# Patient Record
Sex: FEMALE | Race: BLACK OR AFRICAN AMERICAN | NOT HISPANIC OR LATINO | Employment: STUDENT | URBAN - METROPOLITAN AREA
[De-identification: names, ages, dates, MRNs, and addresses within clinical notes are randomized per-mention and may not be internally consistent; named-entity substitution may affect disease eponyms.]

---
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
## 2021-11-23 ENCOUNTER — OFFICE VISIT (OUTPATIENT)
Dept: FAMILY MEDICINE | Facility: CLINIC | Age: 21
End: 2021-11-23
Payer: COMMERCIAL

## 2021-11-23 VITALS
DIASTOLIC BLOOD PRESSURE: 80 MMHG | RESPIRATION RATE: 16 BRPM | BODY MASS INDEX: 18.42 KG/M2 | OXYGEN SATURATION: 98 % | WEIGHT: 104 LBS | HEART RATE: 61 BPM | SYSTOLIC BLOOD PRESSURE: 117 MMHG | TEMPERATURE: 98.9 F

## 2021-11-23 DIAGNOSIS — G47.10 EXCESSIVE SLEEPINESS: ICD-10-CM

## 2021-11-23 DIAGNOSIS — L65.9 HAIR LOSS: ICD-10-CM

## 2021-11-23 DIAGNOSIS — N92.1 MENORRHAGIA WITH IRREGULAR CYCLE: ICD-10-CM

## 2021-11-23 DIAGNOSIS — D50.0 IRON DEFICIENCY ANEMIA DUE TO CHRONIC BLOOD LOSS: Primary | ICD-10-CM

## 2021-11-23 DIAGNOSIS — N89.8 VAGINAL ITCHING: ICD-10-CM

## 2021-11-23 DIAGNOSIS — Z23 HIGH PRIORITY FOR 2019-NCOV VACCINE: ICD-10-CM

## 2021-11-23 DIAGNOSIS — R63.6 UNDERWEIGHT: ICD-10-CM

## 2021-11-23 PROCEDURE — 91300 COVID-19,PF,PFIZER (12+ YRS): CPT | Performed by: FAMILY MEDICINE

## 2021-11-23 PROCEDURE — 99215 OFFICE O/P EST HI 40 MIN: CPT | Mod: 25 | Performed by: FAMILY MEDICINE

## 2021-11-23 PROCEDURE — 0001A COVID-19,PF,PFIZER (12+ YRS): CPT | Performed by: FAMILY MEDICINE

## 2021-11-23 RX ORDER — LEVONORGESTREL AND ETHINYL ESTRADIOL 0.15-0.03
1 KIT ORAL DAILY
Qty: 30 TABLET | Refills: 11 | Status: SHIPPED | OUTPATIENT
Start: 2021-11-23 | End: 2022-02-17

## 2021-11-23 RX ORDER — FLUCONAZOLE 150 MG/1
150 TABLET ORAL DAILY
Qty: 3 TABLET | Refills: 0 | Status: SHIPPED | OUTPATIENT
Start: 2021-11-23 | End: 2021-11-26

## 2021-11-23 ASSESSMENT — ENCOUNTER SYMPTOMS
NERVOUS/ANXIOUS: 0
NECK STIFFNESS: 0
JOINT SWELLING: 0
RESPIRATORY NEGATIVE: 1
ACTIVITY CHANGE: 1
DIZZINESS: 0
UNEXPECTED WEIGHT CHANGE: 1
CARDIOVASCULAR NEGATIVE: 1
ADENOPATHY: 0
ARTHRALGIAS: 0
FEVER: 0
FATIGUE: 1
GASTROINTESTINAL NEGATIVE: 1
LIGHT-HEADEDNESS: 1
NECK PAIN: 0
ENDOCRINE NEGATIVE: 1
FREQUENCY: 0
APPETITE CHANGE: 1
EYES NEGATIVE: 1
DYSURIA: 0
HEADACHES: 0
CHILLS: 0
DIAPHORESIS: 0
DYSPHORIC MOOD: 0
CONFUSION: 0
WEAKNESS: 1
MYALGIAS: 0

## 2021-11-23 NOTE — PATIENT INSTRUCTIONS
Patient Education   Here is the plan from today's visit    Iron deficiency anemia due to chronic blood loss  Menorrhagia with irregular cycle  - start levonorgestrel-ethinyl estradiol (NORDETTE) 0.15-30 MG-MCG tablet; Take 1 tablet by mouth every day at around the same time  - we will call you about setting up IV iron appointments. You will go 3 times. You will need a COVID test before your first appointment.    Excessive sleepiness  Hair loss  Underweight  - may be related to your low iron and not eating well. Recent thyroid testing was normal. Start a daily multi vitamin with Biotin     Vaginal itching  - fluconazole (DIFLUCAN) 150 MG tablet; Take 1 tablet (150 mg) by mouth daily for 3 days     High priority for 2019-nCoV vaccine  - COVID-19,PF,PFIZER (12+ Yrs PURPLE LABEL)    Please call or return to clinic if your symptoms don't go away.    Follow up plan  After 3rd iron infusion for recheck/repeat labs  Thank you for coming to [facility] today.  Lab Testing:  **If you had lab testing today and your results are reassuring or normal they will be mailed to you or sent through [employer] within 7 days.   **If the lab tests need quick action we will call you with the results.  **If you are having labs done on a different day, please call [phone] to schedule at [facility] or [phone] for other [facility] locations. Labs do not offer walk-in appointments.  The phone number we will call with results is # [phone] (home) . If this is not the best number please call our clinic and change the number.  Medication Refills:  If you need any refills please call your pharmacy and they will contact us.   If you need to  your refill at a new pharmacy, please contact the new pharmacy directly. The new pharmacy will help you get your medications transferred faster.   Scheduling:  If you have any concerns about today's visit or wish to schedule another appointment please call our office during  normal business hours [phone] (8-5:00 M-F)  If a referral was made to a [facility] and you don't get a call from central scheduling please call [phone].  If a Mammogram was ordered for you at [facility] call [phone] to schedule or change your appointment.  If you had an EKG/XRay/CT/Ultrasound/MRI ordered the number is [phone] to schedule or change your radiology appointment.   [facility] has limited ultrasound appointments available on Wednesdays, if you would like your ultrasound at [facility], please call [phone] to schedule.   Medical Concerns:  If you have urgent medical concerns please call [phone] at any time of the day.    Erika Ely MD

## 2021-11-23 NOTE — PROGRESS NOTES
Assessment & Plan     Iron deficiency anemia due to chronic blood loss  Menorrhagia with irregular cycle  - start levonorgestrel-ethinyl estradiol (NORDETTE) 0.15-30 MG-MCG tablet; Take 1 tablet by mouth daily  - intolerant of po iron. Agrees to Venofer infusions. Will need RN assist to get scheduled.    Excessive sleepiness/low energy  Hair loss  Underweight  - in the context of anemia, low variety po. Normal TSH.   - start MVI. Reassess s/p Venofer x 3 with full CBC and ferritin  - If not improved with correction of anemia will need to broaden DDX, though recent otherwise neg lab work up reassuring. Should consider underlying mental health diagnosis.    Vaginal itching  - fluconazole (DIFLUCAN) 150 MG tablet; Take 1 tablet (150 mg) by mouth daily for 3 days    High priority for 2019-nCoV vaccine  - COVID-19,PF,PFIZER (12+ Yrs PURPLE LABEL)    Follow-up post 3rd Venofer for repeat labs and recheck      57 minutes spent on the date of the encounter doing chart review, review of outside records, review of test results, patient visit, documentation and discussion with family  :[id number]}     Erika Ely MD  [facility]  ===========================================    Subjective   Cierra is a 21 year old who presents for the following health issues  accompanied by her mother.  Chief Complaint   Patient presents with     RECHECK     vaginal itching; been going on couple months, went to  and itching stopped after taking pill but now burning. Reza blood and hemoglobin was low.      HPI   Seen at [facility] 11/1/21.   Vaginal Discharge     HEARTBURN     Pain with urination   Vaginal Itching     Insomnia      Hgb 9, MCV 67.9  - known menorrhagia and Fe deficiency - was given iron tabs but stopped due to constipation.  Wet prep + yeast - treated with single pill, got better, then now burning and itching again. Clumpy, white.    Has h/o h pylori that she didn't treat for a long time, then finally did. Test  for cure was neg on 11/3/21.  Denies n/v, melena/BRBPR    Review of Systems   Constitutional: Positive for activity change, appetite change, fatigue and unexpected weight change. Negative for chills, diaphoresis and fever.        Weight entered incorrectly in chart. She is 114#   HENT: Negative.         Denies tongue pain   Eyes: Negative.    Respiratory: Negative.    Cardiovascular: Negative.    Gastrointestinal: Negative.    Endocrine: Negative.    Genitourinary: Positive for menstrual problem and vaginal discharge. Negative for dysuria, frequency, genital sores, pelvic pain, urgency and vaginal pain.        Not sexually active.   Discharge is white, clumpy   Musculoskeletal: Negative for arthralgias, joint swelling, myalgias, neck pain and neck stiffness.        Aching in her feet/lower legs    Neurological: Positive for weakness and light-headedness. Negative for dizziness and headaches.   Hematological: Negative for adenopathy.        No bruising, no bleeding from anywhere other than with periods   Psychiatric/Behavioral: Negative for confusion, dysphoric mood and self-injury. The patient is not nervous/anxious.         Mom seems worried about mood, excessive sleeping  Denies substance use            Objective    /80   Pulse 61   Temp 98.9  F (37.2  C) (Oral)   Resp 16   Wt 47.2 kg (104 lb)   LMP 11/17/2021 (Within Days)   SpO2 98%   Breastfeeding No   BMI 18.42 kg/m       Physical Exam  Constitutional:       General: She is not in acute distress.     Appearance: She is not ill-appearing.      Comments: thin   Cardiovascular:      Rate and Rhythm: Normal rate and regular rhythm.      Heart sounds: Normal heart sounds.   Pulmonary:      Effort: Pulmonary effort is normal.      Breath sounds: Normal breath sounds.   Abdominal:      Palpations: Abdomen is soft.      Tenderness: There is no abdominal tenderness.   Skin:     Capillary Refill: Capillary refill takes 2 to 3 seconds.      Coloration: Skin  is not pale.   Neurological:      Mental Status: She is alert.   Psychiatric:      Comments: Appropriately groomed and dressed. Fair eye contact. Denies low mood, worry. Endorses feeling very tired and unmotivated            Office Visit on 10/14/2020   Component Date Value Ref Range Status     Iron 10/14/2020 16* 35 - 180 ug/dL Final     Iron Binding Cap 10/14/2020 380  240 - 430 ug/dL Final     Iron Saturation Index 10/14/2020 4* 15 - 46 % Final     Ferritin 10/14/2020 3* 12 - 150 ng/mL Final     Transferrin 10/14/2020 282  210 - 360 mg/dL Final     WBC 10/14/2020 7.2  4.0 - 11.0 10e9/L Final     RBC Count 10/14/2020 4.80  3.8 - 5.2 10e12/L Final     Hemoglobin 10/14/2020 9.8* 11.7 - 15.7 g/dL Final     Hematocrit 10/14/2020 34.2* 35.0 - 47.0 % Final     MCV 10/14/2020 71* 78 - 100 fl Final     MCH 10/14/2020 20.4* 26.5 - 33.0 pg Final     MCHC 10/14/2020 28.7* 31.5 - 36.5 g/dL Final     RDW 10/14/2020 18.6* 10.0 - 15.0 % Final     Platelet Count 10/14/2020 458* 150 - 450 10e9/L Final     Diff Method 10/14/2020 Automated Method   Final     % Neutrophils 10/14/2020 51.4  % Final     % Lymphocytes 10/14/2020 38.1  % Final     % Monocytes 10/14/2020 9.3  % Final     % Eosinophils 10/14/2020 0.4  % Final     % Basophils 10/14/2020 0.7  % Final     % Immature Granulocytes 10/14/2020 0.1  % Final     Nucleated RBCs 10/14/2020 0  0 /100 Final     Absolute Neutrophil 10/14/2020 3.7  1.6 - 8.3 10e9/L Final     Absolute Lymphocytes 10/14/2020 2.8  0.8 - 5.3 10e9/L Final     Absolute Monocytes 10/14/2020 0.7  0.0 - 1.3 10e9/L Final     Absolute Eosinophils 10/14/2020 0.0  0.0 - 0.7 10e9/L Final     Absolute Basophils 10/14/2020 0.1  0.0 - 0.2 10e9/L Final     Abs Immature Granulocytes 10/14/2020 0.0  0 - 0.4 10e9/L Final     Absolute Nucleated RBC 10/14/2020 0.0   Final     TSH 10/14/2020 2.99  0.40 - 4.00 mU/L Final

## 2021-11-24 ENCOUNTER — TELEPHONE (OUTPATIENT)
Dept: FAMILY MEDICINE | Facility: CLINIC | Age: 21
End: 2021-11-24
Payer: COMMERCIAL

## 2021-11-24 DIAGNOSIS — Z20.822 COVID-19 RULED OUT: ICD-10-CM

## 2021-11-24 DIAGNOSIS — D50.0 IRON DEFICIENCY ANEMIA DUE TO CHRONIC BLOOD LOSS: Primary | ICD-10-CM

## 2021-11-24 PROBLEM — D50.9 ANEMIA, IRON DEFICIENCY: Status: ACTIVE | Noted: 2021-11-24

## 2021-11-24 RX ORDER — ALBUTEROL SULFATE 0.83 MG/ML
2.5 SOLUTION RESPIRATORY (INHALATION)
Status: CANCELLED | OUTPATIENT
Start: 2021-11-24

## 2021-11-24 RX ORDER — ALBUTEROL SULFATE 90 UG/1
1-2 AEROSOL, METERED RESPIRATORY (INHALATION)
Status: CANCELLED
Start: 2021-11-24

## 2021-11-24 RX ORDER — EPINEPHRINE 1 MG/ML
0.3 INJECTION, SOLUTION, CONCENTRATE INTRAVENOUS EVERY 5 MIN PRN
Status: CANCELLED | OUTPATIENT
Start: 2021-11-24

## 2021-11-24 RX ORDER — MEPERIDINE HYDROCHLORIDE 25 MG/ML
25 INJECTION INTRAMUSCULAR; INTRAVENOUS; SUBCUTANEOUS EVERY 30 MIN PRN
Status: CANCELLED | OUTPATIENT
Start: 2021-11-24

## 2021-11-24 RX ORDER — METHYLPREDNISOLONE SODIUM SUCCINATE 125 MG/2ML
125 INJECTION, POWDER, LYOPHILIZED, FOR SOLUTION INTRAMUSCULAR; INTRAVENOUS
Status: CANCELLED
Start: 2021-11-24

## 2021-11-24 RX ORDER — NALOXONE HYDROCHLORIDE 0.4 MG/ML
0.2 INJECTION, SOLUTION INTRAMUSCULAR; INTRAVENOUS; SUBCUTANEOUS
Status: CANCELLED | OUTPATIENT
Start: 2021-11-24

## 2021-11-24 RX ORDER — HEPARIN SODIUM (PORCINE) LOCK FLUSH IV SOLN 100 UNIT/ML 100 UNIT/ML
5 SOLUTION INTRAVENOUS
Status: CANCELLED | OUTPATIENT
Start: 2021-11-24

## 2021-11-24 RX ORDER — HEPARIN SODIUM,PORCINE 10 UNIT/ML
5 VIAL (ML) INTRAVENOUS
Status: CANCELLED | OUTPATIENT
Start: 2021-11-24

## 2021-11-24 RX ORDER — DIPHENHYDRAMINE HYDROCHLORIDE 50 MG/ML
50 INJECTION INTRAMUSCULAR; INTRAVENOUS
Status: CANCELLED
Start: 2021-11-24

## 2021-11-24 ASSESSMENT — ASTHMA QUESTIONNAIRES: ACT_TOTALSCORE: 17

## 2021-11-24 NOTE — TELEPHONE ENCOUNTER
RN called pt to help with IV iron scheduling. Left VM with name and callback number. Can transfer to RN when pt calls back    Mary Su RN

## 2021-11-26 NOTE — TELEPHONE ENCOUNTER
Pt called back and RN relayed need for IV iron. Pt requested [place] location so RN gave [place] number    Mary Su RN

## 2021-12-16 ENCOUNTER — INFUSION THERAPY VISIT (OUTPATIENT)
Dept: INFUSION THERAPY | Facility: CLINIC | Age: 21
End: 2021-12-16
Payer: COMMERCIAL

## 2021-12-16 ENCOUNTER — DOCUMENTATION ONLY (OUTPATIENT)
Dept: ONCOLOGY | Facility: CLINIC | Age: 21
End: 2021-12-16

## 2021-12-16 VITALS
DIASTOLIC BLOOD PRESSURE: 78 MMHG | TEMPERATURE: 97.8 F | HEART RATE: 91 BPM | SYSTOLIC BLOOD PRESSURE: 101 MMHG | OXYGEN SATURATION: 100 % | RESPIRATION RATE: 18 BRPM | WEIGHT: 105 LBS | BODY MASS INDEX: 18.6 KG/M2

## 2021-12-16 DIAGNOSIS — D50.0 IRON DEFICIENCY ANEMIA DUE TO CHRONIC BLOOD LOSS: Primary | ICD-10-CM

## 2021-12-16 PROCEDURE — 99207 PR NO CHARGE LOS: CPT

## 2021-12-16 PROCEDURE — 96375 TX/PRO/DX INJ NEW DRUG ADDON: CPT | Performed by: NURSE PRACTITIONER

## 2021-12-16 PROCEDURE — 96365 THER/PROPH/DIAG IV INF INIT: CPT | Performed by: NURSE PRACTITIONER

## 2021-12-16 PROCEDURE — 96366 THER/PROPH/DIAG IV INF ADDON: CPT | Performed by: NURSE PRACTITIONER

## 2021-12-16 PROCEDURE — 96372 THER/PROPH/DIAG INJ SC/IM: CPT | Mod: 59 | Performed by: NURSE PRACTITIONER

## 2021-12-16 RX ORDER — ALBUTEROL SULFATE 90 UG/1
1-2 AEROSOL, METERED RESPIRATORY (INHALATION)
Status: CANCELLED
Start: 2021-12-18

## 2021-12-16 RX ORDER — EPINEPHRINE 1 MG/ML
0.3 INJECTION, SOLUTION INTRAMUSCULAR; SUBCUTANEOUS EVERY 5 MIN PRN
Status: DISCONTINUED | OUTPATIENT
Start: 2021-12-16 | End: 2021-12-17 | Stop reason: HOSPADM

## 2021-12-16 RX ORDER — MEPERIDINE HYDROCHLORIDE 25 MG/ML
25 INJECTION INTRAMUSCULAR; INTRAVENOUS; SUBCUTANEOUS EVERY 30 MIN PRN
Status: CANCELLED | OUTPATIENT
Start: 2021-12-18

## 2021-12-16 RX ORDER — METHYLPREDNISOLONE SODIUM SUCCINATE 125 MG/2ML
125 INJECTION, POWDER, LYOPHILIZED, FOR SOLUTION INTRAMUSCULAR; INTRAVENOUS
Status: CANCELLED
Start: 2021-12-18

## 2021-12-16 RX ORDER — HEPARIN SODIUM,PORCINE 10 UNIT/ML
5 VIAL (ML) INTRAVENOUS
Status: CANCELLED | OUTPATIENT
Start: 2021-12-18

## 2021-12-16 RX ORDER — EPINEPHRINE 1 MG/ML
0.3 INJECTION, SOLUTION INTRAMUSCULAR; SUBCUTANEOUS EVERY 5 MIN PRN
Status: CANCELLED | OUTPATIENT
Start: 2021-12-18

## 2021-12-16 RX ORDER — HEPARIN SODIUM (PORCINE) LOCK FLUSH IV SOLN 100 UNIT/ML 100 UNIT/ML
5 SOLUTION INTRAVENOUS
Status: CANCELLED | OUTPATIENT
Start: 2021-12-18

## 2021-12-16 RX ORDER — ALBUTEROL SULFATE 0.83 MG/ML
2.5 SOLUTION RESPIRATORY (INHALATION)
Status: CANCELLED | OUTPATIENT
Start: 2021-12-18

## 2021-12-16 RX ORDER — NALOXONE HYDROCHLORIDE 0.4 MG/ML
0.2 INJECTION, SOLUTION INTRAMUSCULAR; INTRAVENOUS; SUBCUTANEOUS
Status: CANCELLED | OUTPATIENT
Start: 2021-12-18

## 2021-12-16 RX ORDER — DIPHENHYDRAMINE HYDROCHLORIDE 50 MG/ML
50 INJECTION INTRAMUSCULAR; INTRAVENOUS
Status: COMPLETED | OUTPATIENT
Start: 2021-12-16 | End: 2021-12-16

## 2021-12-16 RX ORDER — METHYLPREDNISOLONE SODIUM SUCCINATE 125 MG/2ML
125 INJECTION, POWDER, LYOPHILIZED, FOR SOLUTION INTRAMUSCULAR; INTRAVENOUS
Status: DISCONTINUED | OUTPATIENT
Start: 2021-12-16 | End: 2021-12-17 | Stop reason: HOSPADM

## 2021-12-16 RX ORDER — DIPHENHYDRAMINE HYDROCHLORIDE 50 MG/ML
50 INJECTION INTRAMUSCULAR; INTRAVENOUS
Status: CANCELLED
Start: 2021-12-18

## 2021-12-16 RX ADMIN — METHYLPREDNISOLONE SODIUM SUCCINATE 125 MG: 125 INJECTION INTRAMUSCULAR; INTRAVENOUS at 16:23

## 2021-12-16 RX ADMIN — EPINEPHRINE 0.3 MG: 1 INJECTION, SOLUTION INTRAMUSCULAR; SUBCUTANEOUS at 16:27

## 2021-12-16 RX ADMIN — DIPHENHYDRAMINE HYDROCHLORIDE 25 MG: 50 INJECTION INTRAMUSCULAR; INTRAVENOUS at 16:12

## 2021-12-16 RX ADMIN — Medication 250 ML: at 14:23

## 2021-12-16 ASSESSMENT — PAIN SCALES - GENERAL: PAINLEVEL: NO PAIN (0)

## 2021-12-16 NOTE — PROGRESS NOTES
"Entered room to assess patient      Patient scratching limbs stating it \"itches and burns.\"   She has a .   Patient wt = 105 lb.  25mg Benedryl IVP given.      She feels light headed after.  Vomits.  Patient continues to feel nauseated.  Solumedrol given.       and then soon states that her chest hurts and she feels its \"crushing me.\"      Rapid response called.      Epi given IM L thigh.      She feels pressure/pain in her chest.      VS continued to be stable, but patient symptoms did not improve.    911 called.      Patient passed out several times.  Arousable immediately after.      Patient to be transported to ER.     Message sent to ordering provider to discontinue therapy plan and discuss new plan with patient.   12/20 appt cancelled.  Left  for patient   Palak Hargrove, RN      Palak Hargrove, RN        "

## 2021-12-16 NOTE — NURSING NOTE
Situation: Rapid Response was called for a pt experiencing a medication reaction in cancer infusion.    Objective: Pt had been treated with Iron for a primary diagnosis of anemia. Pt received the entire dose and went to the bathroom without difficulty. This was the pt's first time receiving this treatment. Upon her return to the bay, pt began experiencing itching and burning which progressed to chest pain & SOB. Nursing staff called a rapid response and administered 0.3 mg IM epi, 25 mg IV benadryl, & IV solumedrol.    Assessment: Rapid response team found pt lying in infusion chair, hyperventilating, adequate color, alert. C/o left sided chest pain. Displayed severe acute anxiety. Lung sounds found clear & equal all fields. Pt experienced several syncopal episodes secondary to the hyperventilation. Pt additionally experienced an episode of vomiting.     HR: 134  BP: 131/89  RR: 30  SpO2: 100%    Vascular access: 24g IV L AC    Treatment: Pt was provided with low flow O2 via NC for comfort. Staff members provided verbal reassurance and breath coaching to the patient. Additional epi was withheld due to adequate BP & respiratory status.     Location: Cancer Infusion bay    Disposition: 911 was called after assessment by rapid response team. Pt was transported by EMS to [facility] ED.    Protocols Used: Allergic reaction

## 2021-12-16 NOTE — PROGRESS NOTES
Infusion Nursing Note:  Cierra KOHLER Juan presents today for Venofer.    Patient seen by provider today: No   present during visit today: Not Applicable.    Note: This is the pts first time to [facility]. Orientation provided to infusion center, pt also instructed on how and when to use her call light. Questions answered to pt satisfaction. Pt reports some significant fatigue, dizziness with position changes, and some weakness. Education materials provided to pt regarding medication.    At 1555 pt went to the bathroom, upon coming back from bathroom pt reports burning and itching in her legs and hands. Faint, small bumps are noted to BLE. She also reports slight nausea, which can be baseline for her. VSS. Pt denies any trouble breathing, chest pain, or dizziness. Charge RN updated and took over care of this pt.     Intravenous Access:  Peripheral IV placed.    Treatment Conditions:  Not Applicable.    Patient will return 12/20/21 for next appointment.     End of shift report given to infusion RN for completion of care.       Alissa Quiroz, RN

## 2022-01-05 NOTE — PROGRESS NOTES
Notified that Cierra had a hypersensitivity reaction to Iron Venofer in mid December; developed itching, SOB and chest pains and was eventually sent to the ED after receiving benadryl and epinephrine. Improved and was discharged home. Venofer infusion has been discontinued and patient should be rescheduled to discuss alternative treatments for her iron-deficiency anemia.    Alexandra Bryan MD

## 2022-02-17 ENCOUNTER — OFFICE VISIT (OUTPATIENT)
Dept: FAMILY MEDICINE | Facility: CLINIC | Age: 22
End: 2022-02-17
Payer: COMMERCIAL

## 2022-02-17 VITALS
DIASTOLIC BLOOD PRESSURE: 83 MMHG | BODY MASS INDEX: 17.93 KG/M2 | SYSTOLIC BLOOD PRESSURE: 126 MMHG | RESPIRATION RATE: 16 BRPM | HEIGHT: 63 IN | TEMPERATURE: 97.6 F | HEART RATE: 66 BPM | OXYGEN SATURATION: 100 % | WEIGHT: 101.2 LBS

## 2022-02-17 DIAGNOSIS — R63.6 UNDERWEIGHT: ICD-10-CM

## 2022-02-17 DIAGNOSIS — D50.0 IRON DEFICIENCY ANEMIA DUE TO CHRONIC BLOOD LOSS: ICD-10-CM

## 2022-02-17 DIAGNOSIS — Z23 HIGH PRIORITY FOR 2019-NCOV VACCINE: Primary | ICD-10-CM

## 2022-02-17 DIAGNOSIS — N92.1 MENORRHAGIA WITH IRREGULAR CYCLE: ICD-10-CM

## 2022-02-17 DIAGNOSIS — K59.01 SLOW TRANSIT CONSTIPATION: ICD-10-CM

## 2022-02-17 DIAGNOSIS — R53.82 CHRONIC FATIGUE: ICD-10-CM

## 2022-02-17 DIAGNOSIS — Z86.59 HISTORY OF MOOD DISORDER: ICD-10-CM

## 2022-02-17 LAB
ALBUMIN SERPL-MCNC: 4 G/DL (ref 3.4–5)
ALP SERPL-CCNC: 92 U/L (ref 40–150)
ALT SERPL W P-5'-P-CCNC: 12 U/L (ref 0–50)
ANION GAP SERPL CALCULATED.3IONS-SCNC: 8 MMOL/L (ref 3–14)
AST SERPL W P-5'-P-CCNC: 16 U/L (ref 0–45)
BASOPHILS # BLD AUTO: 0 10E3/UL (ref 0–0.2)
BASOPHILS NFR BLD AUTO: 1 %
BILIRUB SERPL-MCNC: 0.5 MG/DL (ref 0.2–1.3)
BUN SERPL-MCNC: 5 MG/DL (ref 7–30)
CALCIUM SERPL-MCNC: 9.3 MG/DL (ref 8.5–10.1)
CHLORIDE BLD-SCNC: 105 MMOL/L (ref 94–109)
CO2 SERPL-SCNC: 25 MMOL/L (ref 20–32)
CREAT SERPL-MCNC: 0.44 MG/DL (ref 0.52–1.04)
CRP SERPL-MCNC: <2.9 MG/L (ref 0–8)
EOSINOPHIL # BLD AUTO: 0 10E3/UL (ref 0–0.7)
EOSINOPHIL NFR BLD AUTO: 1 %
ERYTHROCYTE [DISTWIDTH] IN BLOOD BY AUTOMATED COUNT: 20.1 % (ref 10–15)
GFR SERPL CREATININE-BSD FRML MDRD: >90 ML/MIN/1.73M2
GLUCOSE BLD-MCNC: 89 MG/DL (ref 70–99)
HCT VFR BLD AUTO: 38.8 %
HGB BLD-MCNC: 12.2 G/DL (ref 11.7–15.7)
IMM GRANULOCYTES # BLD: 0 10E3/UL
IMM GRANULOCYTES NFR BLD: 0 %
LYMPHOCYTES # BLD AUTO: 2.8 10E3/UL (ref 0.8–5.3)
LYMPHOCYTES NFR BLD AUTO: 49 %
MCH RBC QN AUTO: 25.4 PG (ref 26.5–33)
MCHC RBC AUTO-ENTMCNC: 31.4 G/DL (ref 31.5–36.5)
MCV RBC AUTO: 81 FL (ref 78–100)
MONOCYTES # BLD AUTO: 0.4 10E3/UL (ref 0–1.3)
MONOCYTES NFR BLD AUTO: 7 %
NEUTROPHILS # BLD AUTO: 2.5 10E3/UL (ref 1.6–8.3)
NEUTROPHILS NFR BLD AUTO: 43 %
PLATELET # BLD AUTO: 367 10E3/UL (ref 150–450)
POTASSIUM BLD-SCNC: 4.1 MMOL/L (ref 3.4–5.3)
PROT SERPL-MCNC: 7.6 G/DL (ref 6.8–8.8)
RBC # BLD AUTO: 4.81 10E6/UL (ref 3.8–5.2)
SODIUM SERPL-SCNC: 138 MMOL/L (ref 133–144)
WBC # BLD AUTO: 5.7 10E3/UL (ref 4–11)

## 2022-02-17 PROCEDURE — 91305 COVID-19,PF,PFIZER (12+ YRS): CPT | Performed by: FAMILY MEDICINE

## 2022-02-17 PROCEDURE — 85025 COMPLETE CBC W/AUTO DIFF WBC: CPT | Performed by: FAMILY MEDICINE

## 2022-02-17 PROCEDURE — 36415 COLL VENOUS BLD VENIPUNCTURE: CPT | Performed by: FAMILY MEDICINE

## 2022-02-17 PROCEDURE — 0052A COVID-19,PF,PFIZER (12+ YRS): CPT | Performed by: FAMILY MEDICINE

## 2022-02-17 PROCEDURE — 82306 VITAMIN D 25 HYDROXY: CPT | Performed by: FAMILY MEDICINE

## 2022-02-17 PROCEDURE — 80053 COMPREHEN METABOLIC PANEL: CPT | Performed by: FAMILY MEDICINE

## 2022-02-17 PROCEDURE — 99215 OFFICE O/P EST HI 40 MIN: CPT | Mod: 25 | Performed by: FAMILY MEDICINE

## 2022-02-17 PROCEDURE — 86140 C-REACTIVE PROTEIN: CPT | Performed by: FAMILY MEDICINE

## 2022-02-17 RX ORDER — LEVONORGESTREL AND ETHINYL ESTRADIOL 0.15-0.03
1 KIT ORAL DAILY
Qty: 30 TABLET | Refills: 11 | Status: SHIPPED | OUTPATIENT
Start: 2022-02-17 | End: 2022-03-18

## 2022-02-17 RX ORDER — BISACODYL 5 MG/1
TABLET, DELAYED RELEASE ORAL
Qty: 60 TABLET | Refills: 11 | Status: SHIPPED | OUTPATIENT
Start: 2022-02-17 | End: 2022-03-18

## 2022-02-17 ASSESSMENT — ASTHMA QUESTIONNAIRES
QUESTION_4 LAST FOUR WEEKS HOW OFTEN HAVE YOU USED YOUR RESCUE INHALER OR NEBULIZER MEDICATION (SUCH AS ALBUTEROL): TWO OR THREE TIMES PER WEEK
QUESTION_3 LAST FOUR WEEKS HOW OFTEN DID YOUR ASTHMA SYMPTOMS (WHEEZING, COUGHING, SHORTNESS OF BREATH, CHEST TIGHTNESS OR PAIN) WAKE YOU UP AT NIGHT OR EARLIER THAN USUAL IN THE MORNING: ONCE A WEEK
QUESTION_2 LAST FOUR WEEKS HOW OFTEN HAVE YOU HAD SHORTNESS OF BREATH: THREE TO SIX TIMES A WEEK
ACT_TOTALSCORE: 16
QUESTION_1 LAST FOUR WEEKS HOW MUCH OF THE TIME DID YOUR ASTHMA KEEP YOU FROM GETTING AS MUCH DONE AT WORK, SCHOOL OR AT HOME: A LITTLE OF THE TIME
QUESTION_5 LAST FOUR WEEKS HOW WOULD YOU RATE YOUR ASTHMA CONTROL: SOMEWHAT CONTROLLED
ACT_TOTALSCORE: 16

## 2022-02-17 ASSESSMENT — ANXIETY QUESTIONNAIRES
2. NOT BEING ABLE TO STOP OR CONTROL WORRYING: NOT AT ALL
5. BEING SO RESTLESS THAT IT IS HARD TO SIT STILL: SEVERAL DAYS
6. BECOMING EASILY ANNOYED OR IRRITABLE: SEVERAL DAYS
GAD7 TOTAL SCORE: 4
7. FEELING AFRAID AS IF SOMETHING AWFUL MIGHT HAPPEN: NOT AT ALL
IF YOU CHECKED OFF ANY PROBLEMS ON THIS QUESTIONNAIRE, HOW DIFFICULT HAVE THESE PROBLEMS MADE IT FOR YOU TO DO YOUR WORK, TAKE CARE OF THINGS AT HOME, OR GET ALONG WITH OTHER PEOPLE: NOT DIFFICULT AT ALL
1. FEELING NERVOUS, ANXIOUS, OR ON EDGE: SEVERAL DAYS
3. WORRYING TOO MUCH ABOUT DIFFERENT THINGS: NOT AT ALL

## 2022-02-17 ASSESSMENT — ENCOUNTER SYMPTOMS
COUGH: 0
WHEEZING: 0
ARTHRALGIAS: 0
MYALGIAS: 0
FEVER: 0
ADENOPATHY: 0
BRUISES/BLEEDS EASILY: 0
DIAPHORESIS: 0
APPETITE CHANGE: 1
JOINT SWELLING: 0
WEAKNESS: 0
SHORTNESS OF BREATH: 1
CHILLS: 0
ACTIVITY CHANGE: 1
HEADACHES: 0
CHEST TIGHTNESS: 0
DIZZINESS: 0
FATIGUE: 1

## 2022-02-17 ASSESSMENT — PATIENT HEALTH QUESTIONNAIRE - PHQ9
5. POOR APPETITE OR OVEREATING: SEVERAL DAYS
SUM OF ALL RESPONSES TO PHQ QUESTIONS 1-9: 8

## 2022-02-17 NOTE — PROGRESS NOTES
Assessment & Plan     Chronic fatigue  Underweight  History of mood disorder/other unclear mental health diagnosis vs other undiagnosed condition  Multiple factors contributing to this troubling presentation. I am concerned about possibility of an under diagnosed mental illness and/or an uncommon other cause for her symptomatology - something infectious, autoimmune? CFS?   - CBC with platelets differential  - Comprehensive metabolic panel  - CRP  - did not order CK as she has no muscle pain    Iron deficiency anemia due to chronic blood loss  Menorrhagia with irregular cycle  - CBC with platelets differential - hgb now above 12 after single iron infusion  - restart levonorgestrel-ethinyl estradiol (NORDETTE) 0.15-30 MG-MCG tablet; Take 1 tablet by mouth daily    Slow transit constipation  - only when taking po iron  - bisacodyl (DULCOLAX) 5 MG EC tablet; Take 1-2 tablets daily as needed for constipation    High priority for 2019-nCoV vaccine  - COVID-19,PF,PFIZER (12+ Yrs GRAY LABEL)    Depending on today's results, I'd like her to have further specialist evaluation, but I'm not quite sure with who. Would very much like to involve Dr. Morgan but family has previously refused psychiatry referrals. Could try to at least get consent for e consult. Could also consider ID, Rheum.     Schedule in person follow-up with me prior to leaving today - 3/3/22.      60 minutes spent on the date of the encounter doing chart review, review of outside records, review of test results, patient visit, documentation and discussion with family     Erika Ely MD  [facility] TISH Norton is a 22 year old who presents for the following health issues  accompanied by her mother.  Chief Complaint   Patient presents with     Hospital F/U     Allergy reaction at [facility] on 12/2022     Anemia     Pt. reports x months  fatigue      Imm/Inj     COVID-19 VACCINE       HPI  "  Continuing care, from 11/23/21 visit. Cierra is iron deficient and intolerant of po so referred for IV replacement. She had an adverse reaction after completing her first infusion that resulted in an ED evaluation - full description in ED note from 12/16/21. Sounds like she developed itching/maybe hives post infusion and then developed panic symptoms. She was given epinephrine and solumedrol and by the time she was in ED she was asymptomatic. Interestingly, hgb from ED same day was 11.7.     Hemoglobin   Date Value Ref Range Status   10/14/2020 9.8 (L) 11.7 - 15.7 g/dL Final   10/30/2019 10.1 (L) 11.7 - 15.7 g/dL Final     Underlying etiology of her anemia is felt to be menorrhagia. Hormonal options for management were reviewed with she and her mother previously and they opted for trial of OCP, which she started in 11/21 and took for 1 month and then did not refill. Cierra says that her period that 1 month \"was better\" and she would like to restart OCP. LMP 1/30/21. Never sexually active per report.    Additional issues: anxious, chronically fatigued, \"sleeps all the time\", low appetite with 18# weight loss over last 2 years. Does have a history of symptomatic h pylori gastritis which was treated in 2019 and with negative retest in 2021. She has repeatedly had normal thyroid testing.    Both Cierra and her mother are very sad today. Her mother says \"she can't do anything, she sleeps all day and night, I bring her food but she only eats once a day\". Cierra says she just feels too tired to do anything most of the time, including eat. Denies nausea or abdominal discomfort, just nothing appeals to her. Says that she wants to go to college and do other things but she doesn't have the energy. Denies feeling depressed or sad. Sort of endorses feeling stressed about how she feels physically. Denies any substance use. Declines to speak with me without mother present.     PHQ 9 score today is 8, scoring 3 on sleeping too much, " 2 on feeling tired/little interest, 1 on little interest of pleasure in doing things and on feeling bad about yourself. No SI.   NANCIE 7 score is 4, scoring 0 on feeling worried or afraid.  ----------------------------------------------------------------  Chart review:  We did not assume Cierra's care until she was 16 yo and there are no records that predate 2015 in [facility] or Care [facility]where.   - [facility] is significant for an episode of B LE weakness and pain that led to a hospital admission in July, 2016 when she was 16. She had a very extensive inpatient evaluation and discharge diagnosis at that time was a likely conversion disorder - record from [facility] is in [facility]. At a follow up visit in October, 2016 her parents told me that when all the tests were negative for a physical cause for her symptoms they consulted a community member who told them that she was likely possessed by a demon. They worked with a Alevism practitioner using prayer and feel she is no longer possessed and her LE symptoms resolved.  - July, 2017 was seen in clinic for depression and suicidality without plan after being caught shop lifting and evaluated at [facility]. Was discharged at home with therapy referral. Unclear that she was every seen. Family has refused further mental health referrals since.    Infrequent follow-up after that (about once a year) at which times labs were ordered but then she was lost to follow-up again-   - November 2018 - seen for fatigue and stomach pain.  - October 2019 - seen for ED follow-up for fatigue, heavy periods, abd pain   - October 2020 - abdominal pain/heavy periods, burning in feet  - November 2021 - last visit with me that lead to iron infusion      Review of Systems   Constitutional: Positive for activity change, appetite change and fatigue. Negative for chills, diaphoresis and fever.        Wt Readings from Last 4 Encounters:  02/17/22 : 45.9 kg (101 lb 3.2 oz)  12/16/21 : 47.6 kg (105 lb)  11/23/21 :  "47.2 kg (104 lb)  10/14/20 : 54.2 kg (119 lb 6.4 oz) (33 %, Z= -0.44)*    * Growth percentiles are based on CDC (Girls, 2-20 Years) data.   Respiratory: Positive for shortness of breath. Negative for cough, chest tightness and wheezing.         Not currently.   ACT score is 16, but is asymptomatic now. Has diagnosis of asthma, but no spirometry.    Gastrointestinal:        Denies nausea, abdominal pain now   Musculoskeletal: Negative for arthralgias, joint swelling and myalgias.   Neurological: Negative for dizziness, weakness and headaches.   Hematological: Negative for adenopathy. Does not bruise/bleed easily.            Objective    /83   Pulse 66   Temp 97.6  F (36.4  C) (Oral)   Resp 16   Ht 1.595 m (5' 2.8\")   Wt 45.9 kg (101 lb 3.2 oz)   LMP 01/30/2022 (Exact Date)   SpO2 100%   BMI 18.04 kg/m       Physical Exam  Constitutional:       Appearance: She is not ill-appearing or toxic-appearing.      Comments: thin   HENT:      Mouth/Throat:      Mouth: Mucous membranes are moist.   Neck:      Thyroid: No thyromegaly.   Cardiovascular:      Rate and Rhythm: Normal rate and regular rhythm.   Pulmonary:      Effort: Pulmonary effort is normal.      Breath sounds: Normal breath sounds.   Abdominal:      Palpations: Abdomen is soft.      Tenderness: There is no abdominal tenderness.   Musculoskeletal:      Cervical back: Normal range of motion.   Lymphadenopathy:      Cervical: No cervical adenopathy.   Skin:     General: Skin is warm.      Capillary Refill: Capillary refill takes less than 2 seconds.   Neurological:      Mental Status: She is alert and oriented to person, place, and time.      Gait: Gait is intact.   Psychiatric:         Attention and Perception: Attention normal.         Mood and Affect: Mood is depressed. Affect is tearful. Affect is not inappropriate.         Speech: Speech is not rapid and pressured or tangential.         Behavior: Behavior is cooperative.         Thought Content: " Thought content does not include suicidal ideation.      Comments: Appropriately groomed and dressed

## 2022-02-18 LAB — DEPRECATED CALCIDIOL+CALCIFEROL SERPL-MC: 5 UG/L (ref 20–75)

## 2022-02-18 ASSESSMENT — ANXIETY QUESTIONNAIRES: GAD7 TOTAL SCORE: 4

## 2022-02-21 DIAGNOSIS — Z72.4 EATING PROBLEM: ICD-10-CM

## 2022-02-21 DIAGNOSIS — E55.9 VITAMIN D DEFICIENCY: Primary | ICD-10-CM

## 2022-02-21 DIAGNOSIS — Z86.59 HISTORY OF MOOD DISORDER: Primary | ICD-10-CM

## 2022-02-21 DIAGNOSIS — R63.6 UNDERWEIGHT: ICD-10-CM

## 2022-02-21 DIAGNOSIS — F32.A DEPRESSION WITH SOMATIZATION: ICD-10-CM

## 2022-02-21 DIAGNOSIS — F45.0 DEPRESSION WITH SOMATIZATION: ICD-10-CM

## 2022-02-21 DIAGNOSIS — R53.82 CHRONIC FATIGUE: ICD-10-CM

## 2022-02-21 RX ORDER — CHOLECALCIFEROL (VITAMIN D3) 50 MCG
1 TABLET ORAL DAILY
Qty: 90 TABLET | Refills: 3 | Status: SHIPPED | OUTPATIENT
Start: 2022-02-21 | End: 2022-05-06

## 2022-02-21 NOTE — RESULT ENCOUNTER NOTE
- Hemoglobin is back in normal range after single iron infusion. May be better able to maintain this if she consistently takes OCP/controls her heavy periods.   - vit d level very low. Advise replacement.  - persistent weight loss, extreme fatigue. Highly suspicious of underlying mental health cause, restrictive eating. Recommend referral to Dr. Morgan/Child Psych     KP

## 2022-02-24 ENCOUNTER — TELEPHONE (OUTPATIENT)
Dept: PSYCHOLOGY | Facility: CLINIC | Age: 22
End: 2022-02-24
Payer: COMMERCIAL

## 2022-02-24 NOTE — TELEPHONE ENCOUNTER
Was able to reach patient at listed home #. She agreed to schedule with Dr. Devries and is scheduled for Monday, February 28th at 3pm for an in person visit.    Routing to Dr. Paz and Dr. Devries for FYI.     MARJORIE Guerrero

## 2022-02-28 ENCOUNTER — OFFICE VISIT (OUTPATIENT)
Dept: PSYCHOLOGY | Facility: CLINIC | Age: 22
End: 2022-02-28
Payer: COMMERCIAL

## 2022-02-28 VITALS
OXYGEN SATURATION: 100 % | HEART RATE: 68 BPM | TEMPERATURE: 98.6 F | DIASTOLIC BLOOD PRESSURE: 83 MMHG | RESPIRATION RATE: 16 BRPM | SYSTOLIC BLOOD PRESSURE: 126 MMHG

## 2022-02-28 DIAGNOSIS — F50.89 OTHER DISORDER OF EATING: ICD-10-CM

## 2022-02-28 DIAGNOSIS — R53.82 CHRONIC FATIGUE: ICD-10-CM

## 2022-02-28 DIAGNOSIS — F33.1 MODERATE EPISODE OF RECURRENT MAJOR DEPRESSIVE DISORDER (H): Primary | ICD-10-CM

## 2022-02-28 PROCEDURE — 99205 OFFICE O/P NEW HI 60 MIN: CPT | Mod: GC | Performed by: STUDENT IN AN ORGANIZED HEALTH CARE EDUCATION/TRAINING PROGRAM

## 2022-02-28 PROCEDURE — 99417 PROLNG OP E/M EACH 15 MIN: CPT | Mod: GC | Performed by: STUDENT IN AN ORGANIZED HEALTH CARE EDUCATION/TRAINING PROGRAM

## 2022-02-28 NOTE — Clinical Note
Let me know if you'd do anything differently! Didn't know how the 90 min visits get billed so I did not place in a level of service. I also wanted to wait for your comments before sending it to Dr. Ely.   Thanks! Dinah

## 2022-02-28 NOTE — Clinical Note
Hi Dr. Ely,    Thank you for referring Cierra!  It seemed she was able to open up during her visit, wants help, and is willing to consider a variety of treatment options.  She is scheduled to see you for a visit tomorrow.  Please see our recommendations (which we discussed with Cierra) and reach out if you have any questions.      As an aside -- thanks to Dr. Nicholson for pulling together such a thorough yet concise note.  Cierra shared a lot during her visit!      Thanks,    Lynette Devries MD  Child & Adolescent Psychiatry
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
## 2022-03-01 PROBLEM — F50.89 OTHER DISORDER OF EATING: Status: ACTIVE | Noted: 2022-03-01

## 2022-03-01 PROBLEM — F34.1 PERSISTENT DEPRESSIVE DISORDER WITH ANXIOUS DISTRESS, CURRENTLY MODERATE: Status: ACTIVE | Noted: 2022-03-01

## 2022-03-02 NOTE — PATIENT INSTRUCTIONS
Plan Today    1) Meds:  Consider trial of fluoxetine; see bottom of the AVS for information about this medication    2) Follow-up:  Visit with Dr. Ely 3/3; scheduled  Recommend additional labs and EKG be completed 3/3   Recommend evaluation regarding eating patterns be completed at [facility] or [place]     3) Other:  Recommend establishing in therapy with your prior therapist at [facility].  You mall directly to schedule with Shelbie (prior therapist)      Thank you for coming to the [facility].    Scheduling:  If you have any concerns about today's visit or wish to schedule another appointment please call our office during normal business hours [phone] (8-5:00 M-F)        MENTAL HEALTH CRISIS NUMBERS:  For a medical emergency please call  911 or go to the nearest ER.     [facility]:   [facility] -[phone]   Crisis Residence [facility] -[phone]   Walk-In Counseling [facility] -[phone]   COPE 24/7 [place] Mobile Team -[phone] (adults)/[phone] (child)  CHILD: [facility] needs assessment team - [phone]      [facility]:   [facility] - [phone]   Walk-in counseling [facility] - [phone]   Walk-in counseling [facility] - [phone]   Crisis Residence [facility] - [phone]  Urgent Care Adult Mental [place]-[phone] mobile unit/ 24/7 crisis line    National Crisis Numbers:   National Suicide Prevention Lifeline: 5-512-558-TALK (228-428-7656)  Poison Control Center - 6-953-022-3336  [employer]/resources for a list of additional resources (SOS)  Trans Lifeline a hotline for transgender people 1-372.863.5401  The Andrea Project a hotline for LGBT youth 6-545-530-4237  Crisis Text Line: For any crisis 24/7   To: 495599  see www.crisistextline.org  - IF MAKING A CALL FEELS TOO HARD, send a text!       Again thank you for  [facility] and please let us know how we can best partner with you to improve you and your family's health.        Patient Education     Fluoxetine HCl Oral Capsule 20 mg  Uses  This medicine is used for the following purposes:    anxiety    depression    eating disorders    menopausal symptoms    muscle weakness    obsessive compulsive disorder    post-traumatic stress disorder  Instructions  This medicine may be taken with or without food.  It is very important that you take the medicine at about the same time every day. It will work best if you do this.  Keep the medicine at room temperature. Avoid heat and direct light.  It is important that you keep taking each dose of this medicine on time even if you are feeling well.  If you forget to take a dose on time, take it as soon as you remember. If it is almost time for the next dose, do not take the missed dose. Return to your normal dosing schedule. Do not take 2 doses of this medicine at one time.  Please tell your doctor and pharmacist about all the medicines you take. Include both prescription and over-the-counter medicines. Also tell them about any vitamins, herbal medicines, or anything else you take for your health.  Do not suddenly stop taking this medicine. Check with your doctor before stopping.  Cautions  Tell your doctor and pharmacist if you ever had an allergic reaction to a medicine. Symptoms of an allergic reaction can include trouble breathing, skin rash, itching, swelling, or severe dizziness.  Do not use the medication any more than instructed.  Your ability to stay alert or to react quickly may be impaired by this medicine. Do not drive or operate machinery until you know how this medicine will affect you.  Please check with your doctor before drinking alcohol while on this medicine.  Contact your doctor if you notice a change in the amount or darkening of your urine.  Family should check on the patient often. Call  the doctor if patient becomes more depressed, has thoughts of suicide, or shows changes in behavior.  Call the doctor if there are any signs of confusion or unusual changes in behavior.  Tell the doctor or pharmacist if you are pregnant, planning to be pregnant, or breastfeeding.  Ask your pharmacist if this medicine can interact with any of your other medicines. Be sure to tell them about all the medicines you take.  Do not start or stop any other medicines without first speaking to your doctor or pharmacist.  If you have painful erection or an erection for more than 4 hours, seek medical care right away.  Do not share this medicine with anyone who has not been prescribed this medicine.  This medicine can cause serious side effects in some patients. Important information from the U.S. Food and Drug Administration (FDA) is available from your pharmacist. Please review it carefully with your pharmacist to understand the risks associated with this medicine.  Side Effects  The following is a list of some common side effects from this medicine. Please speak with your doctor about what you should do if you experience these or other side effects.    agitated feeling or trouble sleeping    decreased appetite    dizziness    drowsiness or sedation    lack of energy and tiredness    nausea    sweating  Call your doctor or get medical help right away if you notice any of these more serious side effects:    bleeding that is severe or takes longer to stop    unusual bruising or discoloration on skin    confusion    coughing up blood or vomit that looks like coffee grounds    swelling of the legs, feet, and hands    pain in the eye    fainting    hallucinations (unusual thoughts, seeing or hearing things that are not real)    fast or irregular heart beats    muscle aches, spasms or abnormal movements    muscle weakness    dilation of the pupils    problems with sexual functions or desire    blood in stool    dark, tarry  stool    suicidal thoughts    blurring or changes of vision    severe or persistent vomiting    unexpected or extreme weight loss  A few people may have an allergic reactions to this medicine. Symptoms can include difficulty breathing, skin rash, itching, swelling, or severe dizziness. If you notice any of these symptoms, seek medical help quickly.  Extra  Please speak with your doctor, nurse, or pharmacist if you have any questions about this medicine.  [email or web address]  IMPORTANT NOTE: This document tells you briefly how to take your medicine, but it does not tell you all there is to know about it.Your doctor or pharmacist may give you other documents about your medicine. Please talk to them if you have any questions.Always follow their advice. There is a more complete description of this medicine available in English.Scan this code on your smartphone or tablet or use the web address below. You can also ask your pharmacist for a printout. If you have any questions, please ask your pharmacist.     2021 [employer].

## 2022-03-15 ENCOUNTER — OFFICE VISIT (OUTPATIENT)
Dept: FAMILY MEDICINE | Facility: CLINIC | Age: 22
End: 2022-03-15
Payer: COMMERCIAL

## 2022-03-15 VITALS
RESPIRATION RATE: 16 BRPM | HEART RATE: 69 BPM | BODY MASS INDEX: 18.25 KG/M2 | TEMPERATURE: 98.2 F | HEIGHT: 63 IN | WEIGHT: 103 LBS | OXYGEN SATURATION: 99 %

## 2022-03-15 DIAGNOSIS — R63.6 UNDERWEIGHT: Primary | ICD-10-CM

## 2022-03-15 DIAGNOSIS — F50.89 OTHER DISORDER OF EATING: ICD-10-CM

## 2022-03-15 DIAGNOSIS — F34.1 PERSISTENT DEPRESSIVE DISORDER WITH ANXIOUS DISTRESS, CURRENTLY MODERATE: ICD-10-CM

## 2022-03-15 DIAGNOSIS — Z72.4 EATING PROBLEM: ICD-10-CM

## 2022-03-15 LAB
FERRITIN SERPL-MCNC: 5 NG/ML (ref 12–150)
MAGNESIUM SERPL-MCNC: 2.2 MG/DL (ref 1.6–2.3)
PHOSPHATE SERPL-MCNC: 3.6 MG/DL (ref 2.5–4.5)
TSH SERPL DL<=0.005 MIU/L-ACNC: 2.66 MU/L (ref 0.4–4)

## 2022-03-15 PROCEDURE — 84443 ASSAY THYROID STIM HORMONE: CPT | Performed by: FAMILY MEDICINE

## 2022-03-15 PROCEDURE — 93000 ELECTROCARDIOGRAM COMPLETE: CPT | Performed by: FAMILY MEDICINE

## 2022-03-15 PROCEDURE — 99214 OFFICE O/P EST MOD 30 MIN: CPT | Performed by: FAMILY MEDICINE

## 2022-03-15 PROCEDURE — 82728 ASSAY OF FERRITIN: CPT | Performed by: FAMILY MEDICINE

## 2022-03-15 PROCEDURE — 83735 ASSAY OF MAGNESIUM: CPT | Performed by: FAMILY MEDICINE

## 2022-03-15 PROCEDURE — 84100 ASSAY OF PHOSPHORUS: CPT | Performed by: FAMILY MEDICINE

## 2022-03-15 PROCEDURE — 36415 COLL VENOUS BLD VENIPUNCTURE: CPT | Performed by: FAMILY MEDICINE

## 2022-03-15 RX ORDER — FLUOXETINE 10 MG/1
10 CAPSULE ORAL DAILY
Status: CANCELLED | OUTPATIENT
Start: 2022-03-15

## 2022-03-15 RX ORDER — FLUOXETINE 10 MG/1
CAPSULE ORAL
Qty: 49 CAPSULE | Refills: 0 | Status: SHIPPED | OUTPATIENT
Start: 2022-03-15 | End: 2022-08-31

## 2022-03-15 ASSESSMENT — ANXIETY QUESTIONNAIRES
IF YOU CHECKED OFF ANY PROBLEMS ON THIS QUESTIONNAIRE, HOW DIFFICULT HAVE THESE PROBLEMS MADE IT FOR YOU TO DO YOUR WORK, TAKE CARE OF THINGS AT HOME, OR GET ALONG WITH OTHER PEOPLE: NOT DIFFICULT AT ALL
1. FEELING NERVOUS, ANXIOUS, OR ON EDGE: SEVERAL DAYS
6. BECOMING EASILY ANNOYED OR IRRITABLE: NEARLY EVERY DAY
GAD7 TOTAL SCORE: 8
7. FEELING AFRAID AS IF SOMETHING AWFUL MIGHT HAPPEN: NOT AT ALL
2. NOT BEING ABLE TO STOP OR CONTROL WORRYING: SEVERAL DAYS
5. BEING SO RESTLESS THAT IT IS HARD TO SIT STILL: SEVERAL DAYS
3. WORRYING TOO MUCH ABOUT DIFFERENT THINGS: SEVERAL DAYS

## 2022-03-15 ASSESSMENT — PATIENT HEALTH QUESTIONNAIRE - PHQ9
5. POOR APPETITE OR OVEREATING: SEVERAL DAYS
SUM OF ALL RESPONSES TO PHQ QUESTIONS 1-9: 15

## 2022-03-15 NOTE — PATIENT INSTRUCTIONS
"CRISIS RESOURCES  Crisis Intervention: 492.499.2208 or 095-850-7040 (TTY: 354.911.3171).  Call anytime for help.   National [place] on Mental Illness (www.mn.[place].org): 873.967.7165 or 725-249-6132.   Suicide Awareness Voices of Education (SAVE) (www.save.org): 358-418-WQXH (7219)   National Suicide Prevention Line (www.mentalhealthmn.org): 947-384-WBOU (5558)   [facility] Crisis Response - Adult 220 044-4564   Text 4 Life: txt \"LIFE\" to 10655 for immediate support and crisis intervention   Crisis text line: Text \"[place]\" to 573210. Free, confidential, 24/7.        "

## 2022-03-15 NOTE — Clinical Note
Maura,   I want to make sure this referral goes to the right place - I'm trying to get her the services recommended by Dr. Devries. Can you review for me please? Thx    KIMBERLYN

## 2022-03-15 NOTE — PROGRESS NOTES
"  Assessment & Plan     Persistent depressive disorder with anxious distress, currently moderate  - Adult Mental Health  Referral; Future - for counseling  - start FLUoxetine (PROZAC) 10 MG capsule; Take 1 capsule (10 mg) by mouth daily for 7 days, THEN 2 capsules (20 mg) daily for 21 days. Discussed potential side effects including rare risk of increased suicidality and has crisis resources. Advised to take in am for activating effect.    Other disorder of eating  Underweight  - TSH, T4, Mg, Phos, Ferritin  - EKG 12-lead complete w/read - Clinics  - Adult Mental Health  Referral; Future - [facility] preferred    Flu shot declined.     Return in 3 weeks (on 4/5/2022) for with me, in person.    Erika Ely MD  [facility]  =====================================     Subjective   Cierra is a 22 year old who presents for the following health issues  accompanied by her mother.  Chief Complaint   Patient presents with     RECHECK     Follow-up psych.        HPI   Continuing care s/p peds psychiatry consult on 2/28/22. Appreciate evaluation and recommendations.     Principle diagnosis: MDD; recurrent; moderate; with anxious distress  Secondary diagnosis: disordered eating  Possible contributing medical diagnosis; Hx of concussion/LOC in childhood    Today reports feeling \"about the same\". Maybe a little more hopeful after consult, that she could actually feel better. Trying to be more mindful about eating and has gained 2#. Doesn't purge.     Wt Readings from Last 4 Encounters:   03/15/22 46.7 kg (103 lb)   02/17/22 45.9 kg (101 lb 3.2 oz)   12/16/21 47.6 kg (105 lb)   11/23/21 47.2 kg (104 lb)     Discussed recommendation for medication with Cierra and her mother. Mother fully supportive, Cierra nervous but willing.     Review of Systems   Constitutional: Positive for fatigue.        Still sleeping quite a lot   Respiratory: Negative.    Gastrointestinal:        No acute GI " "symptoms   Psychiatric/Behavioral: Positive for dysphoric mood. Negative for self-injury and suicidal ideas. The patient is nervous/anxious.       PHQ 9 score 9, no SI  NANCIE 7 score 8      Objective    Pulse 69   Temp 98.2  F (36.8  C) (Oral)   Resp 16   Ht 1.595 m (5' 2.8\")   Wt 46.7 kg (103 lb)   LMP 02/25/2022 (Approximate)   SpO2 99%   BMI 18.36 kg/m     /83    Physical Exam  Constitutional:       General: She is not in acute distress.     Appearance: She is not ill-appearing.      Comments: thin   Neurological:      Mental Status: She is alert.   Psychiatric:         Attention and Perception: Attention normal.         Speech: Speech normal.         Behavior: Behavior is cooperative.      Comments: Appropriately groomed and dressed. Good eye contact. Calm and polite.                     "

## 2022-03-15 NOTE — LETTER
March 18, 2022      Cierra Martinez  [address] [address]        Dear Cierra,    Thank you for getting your care at [facility]. Please see below for your test results.    Your results are normal except for low Ferritin/iron stores. We can talk more about options for helping with this at your next appointment.     Resulted Orders   TSH with free T4 reflex   Result Value Ref Range    TSH 2.66 0.40 - 4.00 mU/L   Magnesium   Result Value Ref Range    Magnesium 2.2 1.6 - 2.3 mg/dL   Phosphorus   Result Value Ref Range    Phosphorus 3.6 2.5 - 4.5 mg/dL   Ferritin   Result Value Ref Range    Ferritin 5 (L) 12 - 150 ng/mL       If you have any concerns about these results please call and leave a message for me or send a mechatronic systemtechnikt message to the clinic.    Sincerely,    Erika Ely MD

## 2022-03-16 ASSESSMENT — ANXIETY QUESTIONNAIRES: GAD7 TOTAL SCORE: 8

## 2022-03-18 ASSESSMENT — ENCOUNTER SYMPTOMS
FATIGUE: 1
DYSPHORIC MOOD: 1
RESPIRATORY NEGATIVE: 1
NERVOUS/ANXIOUS: 1

## 2022-04-05 ENCOUNTER — OFFICE VISIT (OUTPATIENT)
Dept: FAMILY MEDICINE | Facility: CLINIC | Age: 22
End: 2022-04-05
Payer: COMMERCIAL

## 2022-04-05 VITALS
BODY MASS INDEX: 17.43 KG/M2 | SYSTOLIC BLOOD PRESSURE: 120 MMHG | HEIGHT: 63 IN | RESPIRATION RATE: 16 BRPM | DIASTOLIC BLOOD PRESSURE: 86 MMHG | WEIGHT: 98.4 LBS | OXYGEN SATURATION: 97 % | HEART RATE: 94 BPM | TEMPERATURE: 98 F

## 2022-04-05 DIAGNOSIS — F34.1 PERSISTENT DEPRESSIVE DISORDER WITH ANXIOUS DISTRESS, CURRENTLY MODERATE: ICD-10-CM

## 2022-04-05 DIAGNOSIS — R63.4 WEIGHT LOSS: ICD-10-CM

## 2022-04-05 DIAGNOSIS — R11.0 NAUSEA: ICD-10-CM

## 2022-04-05 DIAGNOSIS — N92.1 MENORRHAGIA WITH IRREGULAR CYCLE: ICD-10-CM

## 2022-04-05 DIAGNOSIS — R79.0 LOW FERRITIN: Primary | ICD-10-CM

## 2022-04-05 DIAGNOSIS — Z72.4 EATING PROBLEM: ICD-10-CM

## 2022-04-05 PROCEDURE — 99214 OFFICE O/P EST MOD 30 MIN: CPT | Performed by: FAMILY MEDICINE

## 2022-04-05 RX ORDER — MULTIVITAMIN WITH IRON
1 TABLET ORAL DAILY
Qty: 90 TABLET | Refills: 4 | Status: SHIPPED | OUTPATIENT
Start: 2022-04-05 | End: 2022-05-06

## 2022-04-05 RX ORDER — ONDANSETRON 4 MG/1
4 TABLET, FILM COATED ORAL EVERY 8 HOURS PRN
Qty: 60 TABLET | Refills: 1 | Status: SHIPPED | OUTPATIENT
Start: 2022-04-05 | End: 2024-08-20

## 2022-04-05 RX ORDER — LEVONORGESTREL AND ETHINYL ESTRADIOL 0.15-0.03
1 KIT ORAL DAILY
Qty: 30 TABLET | Refills: 11 | Status: SHIPPED | OUTPATIENT
Start: 2022-04-05 | End: 2022-08-31

## 2022-04-05 NOTE — PROGRESS NOTES
"  Assessment & Plan     Persistent depressive disorder with anxious distress, currently moderate  - continue at Fluoxetine (PROZAC) 20 MG capsule; Take 1 capsule (20 mg) by mouth daily    Eating problem  Nausea  Weight loss  Multifactoral. Predates initiation of Fluoxetine, but notes more nausea since starting. She feels strongly that med is helping her mood however and prefers not to decrease dose, discontinue, change yet. Will trial adding Zofran premed and prn. Check in by My Chart in 2 weeks with in person weight check in 4 weeks.   - ondansetron (ZOFRAN) 4 MG tablet; Take 1 tablet (4 mg) by mouth every 8 hours as needed for nausea  - Follow-up with  requested. Has still not established care with ongoing therapist, referral is in place.    Low ferritin  - refill Multiple Vitamins/Iron TABS; Take 1 tablet by mouth daily. Take at bedtime.    Menorrhagia with irregular cycle  - refill levonorgestrel-ethinyl estradiol (NORDETTE) 0.15-30 MG-MCG tablet; Take 1 tablet by mouth daily    Erika Ely MD  [facility]  ====================================    Subjective   Cierra is a 22 year old who presents for the following health issues  accompanied by her mother.  Chief Complaint   Patient presents with     RECHECK     f/u fatigue        HPI   Fatigue in context of MDD  Started on Fluoxetine 3/15/22. Feels that it is helping her mood and energy level - says \"I'm not staying in bed all day now\". Notes however that since starting med she has had even less interest in eating due to increased nausea. No vomiting. Restrictive eating was already a problem for her and she has lost an additional 5#. Her BMI is now 17.    Disordered eating predates med start. EKG done last visit and normal. TSH WNL. Lytes WNL. Ferritin low.     referral in place but she has not scheduled. Says she wasn't contacted.    Requests refills on mult meds including OCP which she is using to manage menorrhagia. That is working " "very well for her.      Review of Systems   Constitutional: Negative for diaphoresis and fever.   HENT: Negative.    Respiratory: Negative.    Cardiovascular: Negative.    Gastrointestinal: Negative for abdominal pain, constipation, diarrhea and heartburn.   Endocrine: Negative.    Genitourinary: Negative.    Neurological: Negative.    Hematological: Negative.    Psychiatric/Behavioral:        PHQ 9 score 10. No SI.  NANCIE 7 score 6            Objective    /86   Pulse 94   Temp 98  F (36.7  C) (Oral)   Resp 16   Ht 1.6 m (5' 2.99\")   Wt 44.6 kg (98 lb 6.4 oz)   LMP  (LMP Unknown)   SpO2 97%   BMI 17.44 kg/m      Physical Exam  Constitutional:       General: She is not in acute distress.     Appearance: She is not ill-appearing.      Comments: thin   Neurological:      Mental Status: She is alert.   Psychiatric:         Speech: Speech is not rapid and pressured or tangential.         Behavior: Behavior is cooperative.         Thought Content: Thought content is not paranoid. Thought content does not include homicidal or suicidal ideation.      Comments: Appropriately groomed and dressed. Good eye contact. Always polite.             Office Visit on 03/15/2022   Component Date Value Ref Range Status     TSH 03/15/2022 2.66  0.40 - 4.00 mU/L Final     Magnesium 03/15/2022 2.2  1.6 - 2.3 mg/dL Final     Phosphorus 03/15/2022 3.6  2.5 - 4.5 mg/dL Final     Ferritin 03/15/2022 5 (A) 12 - 150 ng/mL Final               "

## 2022-04-09 ASSESSMENT — ENCOUNTER SYMPTOMS
DIARRHEA: 0
CARDIOVASCULAR NEGATIVE: 1
DIAPHORESIS: 0
RESPIRATORY NEGATIVE: 1
ENDOCRINE NEGATIVE: 1
ABDOMINAL PAIN: 0
CONSTIPATION: 0
FEVER: 0
HEMATOLOGIC/LYMPHATIC NEGATIVE: 1
NEUROLOGICAL NEGATIVE: 1
HEARTBURN: 0

## 2022-05-03 DIAGNOSIS — J45.30 MILD PERSISTENT ASTHMA WITHOUT COMPLICATION: Chronic | ICD-10-CM

## 2022-05-03 DIAGNOSIS — E55.9 VITAMIN D DEFICIENCY: ICD-10-CM

## 2022-05-03 DIAGNOSIS — F34.1 PERSISTENT DEPRESSIVE DISORDER WITH ANXIOUS DISTRESS, CURRENTLY MODERATE: ICD-10-CM

## 2022-05-03 DIAGNOSIS — R79.0 LOW FERRITIN: ICD-10-CM

## 2022-05-03 NOTE — TELEPHONE ENCOUNTER
[facility] phone call message- patient requesting a refill:    Full Medication Name:levonorgestrel-ethinyl estradiol (NORDETTE) 0.15-30 MG-MCG tablet     Multiple Vitamins/Iron TABS    FLUoxetine (PROZAC) 20 MG capsule    ondansetron (ZOFRAN) 4 MG tablet    vitamin D3 (CHOLECALCIFEROL) 50 mcg (2000 units) tablet    albuterol (PROVENTIL HFA) 108 (90 Base) MCG/ACT inhaler    fluticasone (FLOVENT HFA) 44 MCG/ACT inhaler      Dose: See chart    Pharmacy confirmed as       [id number] - [address] AT [address] [address]  Phone: [phone] Fax: [phone]  : Yes    Additional Comments: Patient leaving for [facility] on Thursday (05/07) requesting 3 month supply of medication.     OK to leave a message on voice mail? Yes    Primary language: English      needed? No    Call taken on May 3, 2022 at 10:45 AM by Kathy Gagnon

## 2022-05-06 RX ORDER — FLUTICASONE PROPIONATE 44 MCG
2 AEROSOL WITH ADAPTER (GRAM) INHALATION 2 TIMES DAILY
Qty: 10.6 G | Refills: 3 | Status: SHIPPED | OUTPATIENT
Start: 2022-05-06

## 2022-05-06 RX ORDER — CHOLECALCIFEROL (VITAMIN D3) 50 MCG
1 TABLET ORAL DAILY
Qty: 90 TABLET | Refills: 3 | Status: SHIPPED | OUTPATIENT
Start: 2022-05-06 | End: 2022-11-08

## 2022-05-06 RX ORDER — MULTIVITAMIN WITH IRON
1 TABLET ORAL DAILY
Qty: 90 TABLET | Refills: 4 | Status: SHIPPED | OUTPATIENT
Start: 2022-05-06 | End: 2022-08-31

## 2022-05-06 RX ORDER — ALBUTEROL SULFATE 90 UG/1
2 AEROSOL, METERED RESPIRATORY (INHALATION) EVERY 4 HOURS PRN
Qty: 18 G | Refills: 3 | Status: SHIPPED | OUTPATIENT
Start: 2022-05-06 | End: 2024-09-10

## 2022-08-31 ENCOUNTER — TELEPHONE (OUTPATIENT)
Dept: PSYCHOLOGY | Facility: CLINIC | Age: 22
End: 2022-08-31

## 2022-08-31 ENCOUNTER — OFFICE VISIT (OUTPATIENT)
Dept: FAMILY MEDICINE | Facility: CLINIC | Age: 22
End: 2022-08-31
Payer: COMMERCIAL

## 2022-08-31 VITALS
BODY MASS INDEX: 18.39 KG/M2 | WEIGHT: 103.8 LBS | TEMPERATURE: 98.2 F | HEART RATE: 86 BPM | OXYGEN SATURATION: 99 % | HEIGHT: 63 IN | DIASTOLIC BLOOD PRESSURE: 82 MMHG | SYSTOLIC BLOOD PRESSURE: 118 MMHG

## 2022-08-31 DIAGNOSIS — F34.1 PERSISTENT DEPRESSIVE DISORDER WITH ANXIOUS DISTRESS, CURRENTLY MODERATE: Primary | ICD-10-CM

## 2022-08-31 DIAGNOSIS — N92.1 MENORRHAGIA WITH IRREGULAR CYCLE: ICD-10-CM

## 2022-08-31 DIAGNOSIS — R63.6 UNDERWEIGHT: ICD-10-CM

## 2022-08-31 DIAGNOSIS — Z72.4 EATING PROBLEM: ICD-10-CM

## 2022-08-31 DIAGNOSIS — D50.0 IRON DEFICIENCY ANEMIA DUE TO CHRONIC BLOOD LOSS: ICD-10-CM

## 2022-08-31 DIAGNOSIS — J45.40 MODERATE PERSISTENT ASTHMA WITHOUT COMPLICATION: ICD-10-CM

## 2022-08-31 DIAGNOSIS — K29.50 CHRONIC GASTRITIS WITHOUT BLEEDING, UNSPECIFIED GASTRITIS TYPE: ICD-10-CM

## 2022-08-31 LAB
ERYTHROCYTE [DISTWIDTH] IN BLOOD BY AUTOMATED COUNT: 14.4 % (ref 10–15)
FERRITIN SERPL-MCNC: 13 NG/ML (ref 6–175)
HCT VFR BLD AUTO: 39 % (ref 35–47)
HGB BLD-MCNC: 12.3 G/DL (ref 11.7–15.7)
HOLD SPECIMEN: NORMAL
MCH RBC QN AUTO: 26.2 PG (ref 26.5–33)
MCHC RBC AUTO-ENTMCNC: 31.5 G/DL (ref 31.5–36.5)
MCV RBC AUTO: 83 FL (ref 78–100)
PLATELET # BLD AUTO: 405 10E3/UL (ref 150–450)
RBC # BLD AUTO: 4.7 10E6/UL (ref 3.8–5.2)
WBC # BLD AUTO: 5.3 10E3/UL (ref 4–11)

## 2022-08-31 PROCEDURE — 83550 IRON BINDING TEST: CPT | Performed by: STUDENT IN AN ORGANIZED HEALTH CARE EDUCATION/TRAINING PROGRAM

## 2022-08-31 PROCEDURE — 99214 OFFICE O/P EST MOD 30 MIN: CPT | Mod: GC | Performed by: STUDENT IN AN ORGANIZED HEALTH CARE EDUCATION/TRAINING PROGRAM

## 2022-08-31 PROCEDURE — 85027 COMPLETE CBC AUTOMATED: CPT | Performed by: STUDENT IN AN ORGANIZED HEALTH CARE EDUCATION/TRAINING PROGRAM

## 2022-08-31 PROCEDURE — 83540 ASSAY OF IRON: CPT | Performed by: STUDENT IN AN ORGANIZED HEALTH CARE EDUCATION/TRAINING PROGRAM

## 2022-08-31 PROCEDURE — 82728 ASSAY OF FERRITIN: CPT | Performed by: STUDENT IN AN ORGANIZED HEALTH CARE EDUCATION/TRAINING PROGRAM

## 2022-08-31 PROCEDURE — 36415 COLL VENOUS BLD VENIPUNCTURE: CPT | Performed by: STUDENT IN AN ORGANIZED HEALTH CARE EDUCATION/TRAINING PROGRAM

## 2022-08-31 RX ORDER — ASPIRIN 81 MG
1 TABLET, DELAYED RELEASE (ENTERIC COATED) ORAL DAILY
COMMUNITY
Start: 2022-05-06 | End: 2022-08-31

## 2022-08-31 RX ORDER — FLUOXETINE 10 MG/1
10 CAPSULE ORAL DAILY
Qty: 34 UNITS | Refills: 1 | Status: SHIPPED | OUTPATIENT
Start: 2022-08-31 | End: 2022-08-31

## 2022-08-31 RX ORDER — BUDESONIDE AND FORMOTEROL FUMARATE DIHYDRATE 80; 4.5 UG/1; UG/1
2 AEROSOL RESPIRATORY (INHALATION) 2 TIMES DAILY
Qty: 10.2 G | Refills: 1 | Status: SHIPPED | OUTPATIENT
Start: 2022-08-31 | End: 2024-09-10

## 2022-08-31 RX ORDER — LEVONORGESTREL AND ETHINYL ESTRADIOL 0.15-0.03
1 KIT ORAL DAILY
Qty: 30 TABLET | Refills: 11 | Status: SHIPPED | OUTPATIENT
Start: 2022-08-31 | End: 2024-08-20

## 2022-08-31 RX ORDER — FLUOXETINE 10 MG/1
10 CAPSULE ORAL DAILY
Qty: 30 CAPSULE | Refills: 1 | Status: SHIPPED | OUTPATIENT
Start: 2022-08-31 | End: 2022-10-26

## 2022-08-31 RX ORDER — ASPIRIN 81 MG
1 TABLET, DELAYED RELEASE (ENTERIC COATED) ORAL DAILY
Qty: 60 TABLET | Refills: 1 | Status: SHIPPED | OUTPATIENT
Start: 2022-08-31 | End: 2022-11-08

## 2022-08-31 ASSESSMENT — ANXIETY QUESTIONNAIRES
5. BEING SO RESTLESS THAT IT IS HARD TO SIT STILL: SEVERAL DAYS
3. WORRYING TOO MUCH ABOUT DIFFERENT THINGS: SEVERAL DAYS
2. NOT BEING ABLE TO STOP OR CONTROL WORRYING: SEVERAL DAYS
GAD7 TOTAL SCORE: 8
IF YOU CHECKED OFF ANY PROBLEMS ON THIS QUESTIONNAIRE, HOW DIFFICULT HAVE THESE PROBLEMS MADE IT FOR YOU TO DO YOUR WORK, TAKE CARE OF THINGS AT HOME, OR GET ALONG WITH OTHER PEOPLE: NOT DIFFICULT AT ALL
7. FEELING AFRAID AS IF SOMETHING AWFUL MIGHT HAPPEN: NOT AT ALL
1. FEELING NERVOUS, ANXIOUS, OR ON EDGE: SEVERAL DAYS
6. BECOMING EASILY ANNOYED OR IRRITABLE: NEARLY EVERY DAY
GAD7 TOTAL SCORE: 8

## 2022-08-31 ASSESSMENT — PATIENT HEALTH QUESTIONNAIRE - PHQ9
5. POOR APPETITE OR OVEREATING: SEVERAL DAYS
SUM OF ALL RESPONSES TO PHQ QUESTIONS 1-9: 9

## 2022-08-31 ASSESSMENT — ASTHMA QUESTIONNAIRES: ACT_TOTALSCORE: 15

## 2022-08-31 NOTE — PROGRESS NOTES
Met briefly with patient and her mother at request of Dr. Wu. Patient reports seeing a therapist several years ago in the community but was not sure where. Chart shows she met with Dr. Devries for psychiatry consult in February 2022. Recommendations included having an evaluation on the [facility] or [place], and records indicate that a referral was placed but central scheduling was unable to reach patient and closed the referral. Patient expressed ambivalence about pursuing an evaluation at one of these programs. She was agreeable to meeting with this provider to further assess treatment needs. Her mother's main concern was the patient's fatigue.     Plan:     Appointment scheduled with Dr. White on 9/13 at 11:20AM

## 2022-08-31 NOTE — PROGRESS NOTES
Assessment & Plan   Cierra was seen today for recheck medication.    Diagnoses and all orders for this visit:    Persistent depressive disorder with anxious distress, currently moderate  Shares that medication is definitely helping as she had to go without due to not having access while in [place] - mood felt improved [place]. However, she feels like she is more sleepy with the medication. Due to the interplay of her weight and mood, we will attempt to decrease dosage to 10 mg from 20 mg today and follow up in 1 month to assess if this is tolerable for her mood management. Additionally connected her to behavioral health today.  -     FLUoxetine (PROZAC) 10 MG capsule; Take 1 capsule (10 mg) by mouth daily    Underweight  Eating problem  BMI improved to 18.39; however from patient report concern this weight gain was all in [place] and she has returned to previous eating patterns I worry will not help with weight gain. Connected patient with our behavorial health team today as she continues to be interested in mental health resources, but had not been able to connect since she was out of country. Seems like the largest patient identified barrier is her excessive sleepiness, which we are hoping to help by decreasing her prozac dosage. However, it could also be a symptom of her depression; but patient does not identify it as such right now. It is of note that her improved eating habits in [place] also occurred when she did not have access to her prozac. Other options include mirtazapine, which may also worsen the sleepiness, which would be counterintuitive for her main identified boundary. With the change in her dosage, plan to follow up in 1 month to assess if the dosage made it possible for her to consume more calories due to being awake more.     Iron deficiency anemia due to chronic blood loss  Having fatigue that may be due to medication. But given history of iron  deficiency anemia, will plan on doing iron deficiency labs in order to rule out anemia contributing to fatigue. Difficult to ask her to eat more iron rich foods as we are having difficulty with eating and weight loss. Has difficulty with iron supplementation due to constipation - declines miralax and senna. Had an allergic reaction to venofer. Asked patient to attempt multivitamin with iron for further supplementation and the hopefully avoid some of the constipation.  Iron studies revealed lower range of normal iron levels with a decreased binding capacity. Normal hemoglobin. Will continue plan of MV with iron.   - CBC with Platelets and Reflex to Iron Studies  - Ferritin  - MULTIVITAMIN, THERAPEUTIC WITH MINERALS tablet  Dispense: 60 tablet; Refill: 1  - CBC with Platelets and Reflex to Iron Studies  - Ferritin  - Iron & Iron Binding Capacity    Chronic gastritis without bleeding, unspecified gastritis type  Refill requested; filled. Would be prudent to discuss why her gastritis is flaring again.   - omeprazole (PRILOSEC) 20 MG DR capsule  Dispense: 30 capsule; Refill: 3    Menorrhagia with irregular cycle  Refill requested; filled  - levonorgestrel-ethinyl estradiol (NORDETTE) 0.15-30 MG-MCG tablet  Dispense: 30 tablet; Refill: 11    Moderate persistent asthma without complication  Refill requested; filled  - budesonide-formoterol (SYMBICORT) 80-4.5 MCG/ACT Inhaler  Dispense: 10.2 g; Refill: 1      Return in about 4 weeks (around 9/28/2022) for Follow up depression and weight.    Mercedes Wu MD  [facility] is a 22 year old accompanied by her mother, presenting for the following health issues:  Recheck Medication (Follow up on Prozac and patient request refill for her contraceptive tablets )    HPI     Follow up prozac  Started in March or April  Didn't have it when she left the country in May  Came back in June and started taking it again and feels like it  "helps  Medication makes her sleepy; will sleep until 3-4 pm  Mom will give her med around 8 am so she doesn't forget    Weight  Feels like she has gained a couple of pounds  Trouble with remembering to eat if she doesn't eat breakfast  No appetite, but is working on it  In [facility] was easier because she had family who was \"on it\" in offering her food        Objective    /82   Pulse 86   Temp 98.2  F (36.8  C) (Oral)   Ht 1.6 m (5' 3\")   Wt 47.1 kg (103 lb 12.8 oz)   LMP 08/06/2022 (Exact Date)   SpO2 99%   Breastfeeding No   BMI 18.39 kg/m    Body mass index is 18.39 kg/m .  Physical Exam  Constitutional:       General: She is not in acute distress.     Appearance: Normal appearance. She is not ill-appearing, toxic-appearing or diaphoretic.   HENT:      Head: Normocephalic and atraumatic.   Eyes:      Extraocular Movements: Extraocular movements intact.      Conjunctiva/sclera: Conjunctivae normal.   Cardiovascular:      Rate and Rhythm: Normal rate.   Pulmonary:      Effort: Pulmonary effort is normal. No respiratory distress.   Musculoskeletal:      Right lower leg: No edema.      Left lower leg: No edema.   Neurological:      Mental Status: She is alert.   Psychiatric:         Mood and Affect: Mood normal.         Behavior: Behavior normal.         Thought Content: Thought content normal.         Judgment: Judgment normal.          "

## 2022-08-31 NOTE — PROGRESS NOTES
Preceptor Attestation:    I discussed the patient with the resident and evaluated the patient in person. I have verified the content of the note, which accurately reflects my assessment of the patient and the plan of care.   Supervising Physician:  Mehdi Gaxiola MD.

## 2022-08-31 NOTE — PATIENT INSTRUCTIONS
Patient Education   Here is the plan from today's visit    1. Persistent depressive disorder with anxious distress, currently moderate  We are going to decrease the medication dose to 10 mg due to the sleepiness and see if that thelps    - FLUoxetine (PROZAC) 10 MG capsule; Take 1 capsule (10 mg) by mouth daily  Dispense: 34 Units; Refill: 1    2. Menorrhagia with irregular cycle  Refilled    - levonorgestrel-ethinyl estradiol (NORDETTE) 0.15-30 MG-MCG tablet; Take 1 tablet by mouth daily  Dispense: 30 tablet; Refill: 11    3. Iron deficiency anemia due to chronic blood loss  We are going to recheck your labs to make sure you aren't having worsening anemia which could be contributing to the sleepiness. Try the multivitamin with iron to get a little more iron in you without getting the constipation of an iron pill    - CBC with Platelets and Reflex to Iron Studies; Future  - Ferritin; Future  - MULTIVITAMIN, THERAPEUTIC WITH MINERALS tablet; Take 1 tablet by mouth daily  Dispense: 60 tablet; Refill: 1    4. Chronic gastritis without bleeding, unspecified gastritis type  Refilled. It may be prudent in the future to talk about why this is flaring up again.    - omeprazole (PRILOSEC) 20 MG DR capsule; Take 1 capsule (20 mg) by mouth daily  Dispense: 30 capsule; Refill: 3      Please call or return to clinic if your symptoms don't go away.    Follow up plan  Return in about 4 weeks (around 9/28/2022) for Follow up depression and weight.    Thank you for coming to [facility] today.  Lab Testing:  **If you had lab testing today and your results are reassuring or normal they will be mailed to you or sent through [employer] within 7 days.   **If the lab tests need quick action we will call you with the results.  **If you are having labs done on a different day, please call [phone] to schedule at [facility] or [phone] for other [facility] locations. Labs do not offer walk-in  appointments.  The phone number we will call with results is # [phone] (home) . If this is not the best number please call our clinic and change the number.  Medication Refills:  If you need any refills please call your pharmacy and they will contact us.   If you need to  your refill at a new pharmacy, please contact the new pharmacy directly. The new pharmacy will help you get your medications transferred faster.   Scheduling:  If you have any concerns about today's visit or wish to schedule another appointment please call our office during normal business hours [phone] (8-5:00 M-F)  If a referral was made to an [facility] specialty provider and you do not get a call from central scheduling, please refer to directions on your visit summary or call our office during normal business hours for assistance.   If a Mammogram was ordered for you at the [facility] call [phone] to schedule or change your appointment.  If you had an XRay/CT/Ultrasound/MRI ordered the number is [phone] to schedule or change your radiology appointment.   [facility] has limited ultrasound appointments available on Wednesdays, if you would like your ultrasound at [facility], please call [phone] to schedule.   Medical Concerns:  If you have urgent medical concerns please call [phone] at any time of the day.    Mercedes Wu MD

## 2022-09-02 LAB
IRON BINDING CAPACITY (ROCHE): 353 UG/DL (ref 240–430)
IRON SATN MFR SERPL: 11 % (ref 15–46)
IRON SERPL-MCNC: 40 UG/DL (ref 37–145)

## 2022-09-12 ENCOUNTER — OFFICE VISIT (OUTPATIENT)
Dept: PSYCHOLOGY | Facility: CLINIC | Age: 22
End: 2022-09-12
Payer: COMMERCIAL

## 2022-09-12 DIAGNOSIS — F33.1 MODERATE EPISODE OF RECURRENT MAJOR DEPRESSIVE DISORDER (H): Primary | ICD-10-CM

## 2022-09-12 PROCEDURE — 90834 PSYTX W PT 45 MINUTES: CPT | Mod: HN | Performed by: PSYCHOLOGIST

## 2022-09-12 NOTE — PROGRESS NOTES
Behavioral Health Progress Note    Client's Legal Name: Cierra Martinez    Client's Preferred Name: Cierra  YOB: 2000  Type of Service: in-person, counseling  Length of Service:   Start time: 11:25AM    End time: 12:05PM    Duration: 40 minutes  Attendees: patient    Identifying Information and Presenting Problem:  This was a first session with Cierra, who is a 22 year old female being seen for problematic symptoms of depression. Patient met with Dr. Devries on 2/28/22 and received following diagnoses: MDD; recurrent; moderate; with anxious distress (principle) and disordered eating (secondary)    Treatment Objective(s) Addressed in This Session:  Gather information regarding presenting concerns and history   Establish rapport    Progress on / Status of Treatment Objective(s) / Homework:  Establishing care today    Mental Health Screening Questionnaires:  PHQ-9:   PHQ 2/17/2022 3/15/2022 8/31/2022   PHQ-9 Total Score 8 15 9   Q9: Thoughts of better off dead/self-harm past 2 weeks Not at all Not at all Not at all      NANCIE-7:   NANCIE-7 SCORE 2/17/2022 3/15/2022 8/31/2022   Total Score 4 8 8       Topics Discussed/Interventions Provided:  This was my first visit with this Cierra. We reviewed her rights to and the limits of confidentiality. This discussion also included an overview of integrated care as well as the role of open notes in their electronic health record. Cierra was also informed of my position as a post-doctoral fellow and given my supervisor's contact information. Patient was encouraged to ask questions and verbally consented to services provided today.    Presenting concerns:     Depression   Cierra describes start of mental health challenges beginning in 2016 when she was sent to the hospital for suicidal ideation. Describes another episode occurring near the end of 2019, during which she was sad all the time, didn't want to do anything, quit all jobs, and started performing poorly in  school. Currently, Cierra complains of fatigue, challenges with motivation and disinterest in socializing. She says she avoids talking about her feelings for fear of being a burden to others. Denies any symptoms of suicidality in past 3 years. Denies challenges with sleep and says mom wakes her up each morning and gives her anti-depressant.     History  Reports making plans to act on suicidal ideation in 2016 and told her parents, leading to evaluation. Cierra reports passive SI in 2019 related to wanting to stop feeling so miserable, including anxiety about school and alleviating parents' worry about her health.     Disordered Eating   Reports that eating is better managed but that she mostly just forgets to eat. Previously, she reports having one bite of food and being full the whole day. She said she learned of her eating disorder in the spring and is open to attending a program. Cierra is mostly frustrated with everyone paying attention to her weight. She doesn't like when people point out she is too skinny or way they express that she has gained weight.    Provided empathetic listening while gathering information about presenting concerns.     Assessment:   The patient appeared to be active and engaged in today's session and was receptive to feedback.     Mental Status:   During interaction with the examiner today, Cierra was cooperative, anxious, engaged and pleasant. Patient was generally alert and oriented to person, place, time, and situation. They were casually dressed, appropriately groomed, appeared stated age and wore several layers . Patient's attire was appropriate for the weather and occasion. Eye contact was good. Psychomotor functioning: fidgety. Speech was normal limits tone, rate, volume; largely coherent and relevant to topic. Mood was unremarkable; affect was somewhat constricted. Thought processes were unremarkable. Thought content was not remarkable with no evidence of psychotic features and no  evidence of suicide, homicide, or nonsuicidal self injury related thoughts, intent, urges, planning, behavior/recent attempts. Memory appeared grossly intact without being formally evaluated. Insight: adequate. Judgment was good. Patient exhibited good impulse control during the appointment.     Does the patient appear to be at imminent risk of harm to self/others at this time? No    The session was necessary to address symptoms of depression and disordered eating that have been interfering with patient's ability to function in areas related to work, school, interacting and relating with others, maintaining personal health and physical well-being and participating in meaningful activities. Ongoing psychotherapy is necessary to provide counseling.    DSM-5 Diagnosis:  Per MR:   Major Depressive Disorder; recurrent; moderate; with anxious distress  Disordered Eating       Plan:  1. Follow up with this provider 9/26/22  2. Cierra will call Eating Disorder programs in [place]  3. Follow up with Dr. Wu on 9/27   4. After Visit Summary was printed for patient    KASI VASQUES, PhD    NOTE: Treatment plan due third session.  Diagnostic assessment update due by 2/27/23.

## 2022-09-12 NOTE — PATIENT INSTRUCTIONS
So nice meeting with you today, Cierra! Please schedule with me on 9/26 at 11:20AM.     [facility]  [phone]  Locations in [place], [place] and [place]    [facility]  [phone]. [phone]  Multiple locations in [facility]    ______________________________________________________________________________________    [facility]  Behavioral Health and Addiction Services  [address] [address]  [phone]  First Session Patient Information Sheet    My name is Steven White, Ph.D. and I look forward to working with you! I earned my Ph.D. in Counseling Psychology at [employer]. I am currently in a postdoctoral fellowship at [facility] to receive specialized training in primary care behavioral health. I am also working to complete my supervised hours to become a licensed psychologist in the [facility].     My direct supervisor at the [facility] [employer] [facility] [employer] is licensed psychologist, Maura Paz Psy.D. She and I meet individually each week to discuss my training and clinical caseload. Just like the residents you may have worked with, the work you and I complete together will be billed under my supervisor's name. Therefore, you will likely see reports from your insurance provider with Dr. Paz's name rather than mine. Dr. Paz can be reached at [phone] if you have any questions or concerns.       Here is some general information about behavioral health services. Please note that your exact experience may be different based on our discussion today.    Your first 1-2 sessions are focused on assessment.This means that I will be exploring what brings you in today and what you are hoping to get out of behavioral health services. I will likely ask you a lot of questions about your current symptoms, health history, as well as information about your relationships, emotions, behaviors,  experiences, childhood, family of origin, etc. I may also ask you to complete several questionnaires and checklists as part of that assessment process.    When the assessment is completed, we will review the results and work together to develop a plan for treatment. This discussion will include recommendations for services that are most appropriate for you based on available research and practice guidelines. Many times, I will continue with your care unless it becomes clear that we need to refer you to another provider or organization better suited to meet your specific needs. We may also discuss other services that you may benefit from engaging with, such as the social work team or the clinic's legal partnership.    Most patients attend appointments once a week or once every other week at the beginning of treatment. However, we will discuss a schedule that best fits your needs.    If you get primary care services here at the clinic, I will also update your provider about your diagnosis and treatment plan to coordinate your care.    Due to confidentiality, I cannot exchange e-mail with patients. If you need to reach me, please leave a message through the  [phone].    I am committed to providing my patients with the best care possible. That means it is important to me that I provider services that fit for your unique needs and preferences. If there is anything you would like me to do differently, please let me know at any time!    Thank you, and I look forward to working with you!    Steven White, PhD  She/her/hers  Primary Care Behavioral Health Fellow

## 2022-09-14 NOTE — PROGRESS NOTES
Preceptor Attestation:  I have reviewed and agree with the behavioral health fellow's documentation for this visit.  I did not see the patient.  Supervising Clinical Psychologist:  Maura Paz PSYD LP

## 2022-09-26 ENCOUNTER — TELEPHONE (OUTPATIENT)
Dept: FAMILY MEDICINE | Facility: CLINIC | Age: 22
End: 2022-09-26

## 2022-09-26 NOTE — TELEPHONE ENCOUNTER
Placed a call to Cierra after she did not check-in for scheduled in-person counseling appointment at 11:20 AM on 9/26/2022. This is patient's first no-show with this provider.    Attempted to contact patient by phone number recorded in medical record ending [id number] at 11:30 AM. Patient did not answer.    Left a voicemail message requesting the patient call the [facility]  at [phone] to re-schedule the appointment should she be interested.     Plan:   Primary Care/Referring Provider Dr. Wu will be alerted to this note for information only.    Patient does not have any follow up appointment scheduled with the clinic at this time.    One additional outreach call will be placed by this provider to reschedule this appointment. If that attempt is unsuccessful, a letter will be mailed to the address on file. Following that letter, no additional outreach attempts will be made unless this provider is contacted by another referring provider.     Steven White, PhD  She/her/hers  Primary Care Health Behavior Fellow

## 2022-10-02 RX ORDER — FLUOXETINE 10 MG/1
10 CAPSULE ORAL DAILY
Qty: 30 CAPSULE | Refills: 1 | Status: CANCELLED | OUTPATIENT
Start: 2022-10-02

## 2022-10-02 NOTE — TELEPHONE ENCOUNTER
"Request for medication refill: FLUoxetine (PROZAC) 10 MG capsule    Providers if patient needs an appointment and you are willing to give a one month supply please refill for one month and  send a letter/MyChart using \".SMILLIMITEDREFILL\" .smillimited and route chart to \"P [facility] \" (Giving one month refill in non controlled medications is strongly recommended before denial)    If refill has been denied, meaning absolutely no refills without visit, please complete the smart phrase \".smirxrefuse\" and route it to the \"P [facility] MED REFILLS\"  pool to inform the patient and the pharmacy.    Anirudh Yanez, [facility]      "

## 2022-10-03 NOTE — TELEPHONE ENCOUNTER
Pt needs appt for refill per PCP. No showed on 9/27 with merry. RN left  with name and callback number to schedule appt    Mary Posey RN

## 2022-10-10 ENCOUNTER — TELEPHONE (OUTPATIENT)
Dept: PSYCHOLOGY | Facility: CLINIC | Age: 22
End: 2022-10-10

## 2022-10-10 NOTE — TELEPHONE ENCOUNTER
Attempted to contact Cierra, for outreach after being unable to reach her for telephone counseling appointment with this provider on 10/03 at 10:40 AM. This is patient's second no-show or late cancellation with this provider.    Attempted to contact patient by phone number recorded in medical record ending [id number] at 2:30 AM. Patient did not answer.    Left a voicemail message requesting the patient call the [facility]  at [phone] to re-schedule the appointment should she be interested.     Plan:   Primary Care/Referring Provider Dr. Wu will be alerted to this note for awareness.    Patient does not have any follow up appointment scheduled with the clinic at this time.    A letter will be mailed to the address on file. Following that letter, no additional outreach attempts will be made unless this provider is contacted by another referring provider.     Steven White, PhD  Pronouns: She/her/hers  Primary Care Health Behavior Fellow

## 2022-10-26 DIAGNOSIS — Z86.59 HISTORY OF MOOD DISORDER: Primary | ICD-10-CM

## 2022-10-26 DIAGNOSIS — F50.89 OTHER DISORDER OF EATING: ICD-10-CM

## 2022-10-26 RX ORDER — FLUOXETINE 10 MG/1
10 CAPSULE ORAL DAILY
Qty: 30 CAPSULE | Refills: 1 | Status: SHIPPED | OUTPATIENT
Start: 2022-10-26 | End: 2022-11-08

## 2022-10-26 NOTE — TELEPHONE ENCOUNTER

## 2022-11-08 ENCOUNTER — OFFICE VISIT (OUTPATIENT)
Dept: FAMILY MEDICINE | Facility: CLINIC | Age: 22
End: 2022-11-08
Payer: COMMERCIAL

## 2022-11-08 VITALS
DIASTOLIC BLOOD PRESSURE: 85 MMHG | SYSTOLIC BLOOD PRESSURE: 118 MMHG | HEIGHT: 63 IN | WEIGHT: 101 LBS | BODY MASS INDEX: 17.89 KG/M2 | OXYGEN SATURATION: 99 % | RESPIRATION RATE: 18 BRPM | TEMPERATURE: 98.6 F | HEART RATE: 67 BPM

## 2022-11-08 DIAGNOSIS — Z72.4 EATING PROBLEM: ICD-10-CM

## 2022-11-08 DIAGNOSIS — G47.10 EXCESSIVE SLEEPINESS: ICD-10-CM

## 2022-11-08 DIAGNOSIS — F50.89 OTHER DISORDER OF EATING: ICD-10-CM

## 2022-11-08 DIAGNOSIS — R63.6 UNDERWEIGHT: ICD-10-CM

## 2022-11-08 DIAGNOSIS — E55.9 VITAMIN D DEFICIENCY: ICD-10-CM

## 2022-11-08 DIAGNOSIS — Z86.59 HISTORY OF MOOD DISORDER: Primary | ICD-10-CM

## 2022-11-08 DIAGNOSIS — D50.0 IRON DEFICIENCY ANEMIA DUE TO CHRONIC BLOOD LOSS: ICD-10-CM

## 2022-11-08 PROCEDURE — 99214 OFFICE O/P EST MOD 30 MIN: CPT | Mod: GC | Performed by: STUDENT IN AN ORGANIZED HEALTH CARE EDUCATION/TRAINING PROGRAM

## 2022-11-08 RX ORDER — CHOLECALCIFEROL (VITAMIN D3) 50 MCG
1 TABLET ORAL DAILY
Qty: 90 TABLET | Refills: 3 | Status: SHIPPED | OUTPATIENT
Start: 2022-11-08 | End: 2024-08-20

## 2022-11-08 RX ORDER — ASPIRIN 81 MG
1 TABLET, DELAYED RELEASE (ENTERIC COATED) ORAL DAILY
Qty: 60 TABLET | Refills: 1 | Status: SHIPPED | OUTPATIENT
Start: 2022-11-08 | End: 2024-08-20

## 2022-11-08 RX ORDER — FLUOXETINE 10 MG/1
10 CAPSULE ORAL DAILY
Qty: 60 CAPSULE | Refills: 1 | Status: SHIPPED | OUTPATIENT
Start: 2022-11-08 | End: 2023-03-15

## 2022-11-08 NOTE — PATIENT INSTRUCTIONS
[facility]  [phone]  Locations in [place], [place] and [facility]  [phone]  Multiple locations in the [facility]    Please call the facilities    Patient Education   Here is the plan from today's visit    1. History of mood disorder  2. Other disorder of eating  We will increase the fluoxetine to 30 mg today    Please call the above numbers today    Let's have you come back in 1 month.     - FLUoxetine (PROZAC) 20 MG capsule; Take 1 capsule (20 mg) by mouth daily  Dispense: 60 capsule; Refill: 1  - FLUoxetine (PROZAC) 10 MG capsule; Take 1 capsule (10 mg) by mouth daily  Dispense: 60 capsule; Refill: 1    Please call or return to clinic if your symptoms don't go away.    Follow up plan  Return in about 4 weeks (around 12/6/2022).    Thank you for coming to [facility] today.  Lab Testing:  The phone number we will call with results is # [phone] (home) . If this is not the best number please call our clinic and change the number.  Medication Refills:  If you need any refills please call your pharmacy and they will contact us.   If you need to  your refill at a new pharmacy, please contact the new pharmacy directly. The new pharmacy will help you get your medications transferred faster.   Scheduling:  If you have any concerns about today's visit or wish to schedule another appointment please call our office during normal business hours [phone] (8-5:00 M-F)  Medical Concerns:  If you have urgent medical concerns please call [phone] at any time of the day.    Mercedes Wu MD

## 2022-11-08 NOTE — PROGRESS NOTES
Assessment & Plan     History of mood disorder  Excessive Sleepiness  Patient felt that despite changes from 10 mg (and then going back up to 20mg due to running out of medication and did not have show to follow up as planned and patient was unaware she needed to ask for refill), there was no change in her sleep. Still feels like she is sleeping a lot; now is working a full time job at [employer]. Overall, continues to be very difficult to balance mood, sleepiness, and eating (see below).Will have patient follow up in 4 weeks to continue to assess and adjust medications as possible.  - FLUoxetine (PROZAC) 20 MG capsule  Dispense: 60 capsule; Refill: 1  - FLUoxetine (PROZAC) 10 MG capsule  Dispense: 60 capsule; Refill: 1    Other disorder of eating  Underweight  BMI of 17.89  Since last visit, patient did loose ~3#; which considering the amount of food she reports eating (one meal that is small + chips) is better than expected. Re-copied information for disordered eating centers from her last visit with Dr. White. Patient states she should prior to her next shift at work today. Reinforced the importance of both medication (as above) and therapy. At that visit if still having difficulty with weight gain, could consider mirtazapine.    Wt Readings from Last 4 Encounters:   11/08/22 45.8 kg (101 lb)   08/31/22 47.1 kg (103 lb 12.8 oz)   04/05/22 44.6 kg (98 lb 6.4 oz)   03/15/22 46.7 kg (103 lb)     Vitamin D deficiency  Refill requested; filled  - vitamin D3 (CHOLECALCIFEROL) 50 mcg (2000 units) tablet  Dispense: 90 tablet; Refill: 3    Iron deficiency anemia due to chronic blood loss  Refill requested; filled  - MULTIVITAMIN, THERAPEUTIC WITH MINERALS tablet  Dispense: 60 tablet; Refill: 1      Return in about 4 weeks (around 12/6/2022).    Mercedes Wu MD  [facility] TISH Norton is a 22 year old accompanied by her mother, presenting for the following health issues:  RECHECK    HPI  "    Been busy working full time at [employer]    Has been still sleeping; unable to take the 10 mg because she ran out and the pharmacy was unable to fill again; so she was taking the old 20 mg. Feels like it didn't effect her sleep in either direction    Would be interested in taking 30 mg.    Feels like being tired has been a large barrier to doing things  Currently not bleeding a lot with periods    Has not having trouble with focus when she was younger        Objective    /85   Pulse 67   Temp 98.6  F (37  C)   Resp 18   Ht 1.6 m (5' 3\")   Wt 45.8 kg (101 lb)   SpO2 99%   BMI 17.89 kg/m    Body mass index is 17.89 kg/m .  Physical Exam  Constitutional:       General: She is not in acute distress.     Appearance: Normal appearance. She is not ill-appearing, toxic-appearing or diaphoretic.   HENT:      Head: Normocephalic and atraumatic.   Eyes:      Conjunctiva/sclera: Conjunctivae normal.   Cardiovascular:      Rate and Rhythm: Normal rate.   Pulmonary:      Effort: Pulmonary effort is normal. No respiratory distress.   Neurological:      Mental Status: She is alert.   Psychiatric:         Mood and Affect: Mood normal.         Behavior: Behavior normal.         Thought Content: Thought content normal.         Judgment: Judgment normal.                "

## 2022-11-15 NOTE — PROGRESS NOTES
Preceptor Attestation:   Patient seen, evaluated and discussed with the resident. I have verified the content of the note, which accurately reflects my assessment of the patient and the plan of care.   Supervising Physician:  Drew Christianson MD

## 2023-02-22 NOTE — PATIENT INSTRUCTIONS
Here is the summary from today's visit.    1. Preventive measure  - hepatitis A vaccine, HPV #2 (last one in 5 months)  - HIV Antigen Antibody Combo    2. Iron deficiency anemia due to chronic blood loss  - repeated labs  - TAKE THE IRON EVERY OTHER DAY     3. Abnormal uterine bleeding  4. Dysmenorrhea  TRACK YOUR PERIODS  START TAKING IBUPROFEN THE NIGHT BEFORE YOU GET YOUR PERIOD, TAKE 600 MG 4 TIMES A DAY FOR 3 DAYS   COME BACK IN 3 MONTHS TO DISCUSS AGAIN    5. Heartburn  Patient Education         The esophagus is a tube that carries food from the mouth to the stomach. A valve (the LES, lower esophageal sphincter) at the lower end of the esophagus prevents stomach acid from flowing upward. When this valve doesn't work properly, stomach contents may repeatedly flow back up (reflux) into the esophagus. This is called gastroesophageal reflux disease (GERD). GERD can irritate the esophagus. It can cause problems with pain, swallowing or breathing. In severe cases, GERD can cause recurrent pneumonia (from aspiration or breathing in particles) or other serious problems.  Symptoms of reflux include burning, pressure or sharp pain in the upper abdomen or mid to lower chest. The pain can spread to the neck, back, or shoulder. There may be belching, an acid taste in the back of the throat, chronic cough, or sore throat, or hoarseness. GERD symptoms often occur during the day after a big meal. They can also occur at night when lying down.   Home care  Lifestyle changes can help reduce symptoms. If needed, your healthcare provider may prescribe medicines. Symptoms often improve with treatment, but if treatment is stopped, the symptoms often return after a few months. So most persons with GERD will need to continue treatment or get treatment on and off.  Lifestyle changes    Limit or avoid fatty, fried, and spicy foods, as well as coffee, chocolate, mint, and foods with high acid content such as tomatoes and citrus fruit and  juices (orange, grapefruit, lemon).    Don t eat large meals, especially at night. Frequent, smaller meals are best. Don't lie down right after eating. And don t eat anything 3 hours before going to bed.    Don't drink alcohol or smoke. As much as possible, stay away from second hand smoke.  Medicines  If needed, medicines can help relieve the symptoms of GERD and prevent damage to the esophagus. Discuss a medicine plan with your healthcare provider. This may include one or more of the following medicines:    Antacids to help neutralize the normal acids in your stomach.    Acid blockers (Histamine or H2 blockers) to decrease acid production.    Acid inhibitors (proton pump inhibitors PPIs) to decrease acid production in a different way than the blockers. They may work better, but can take a little longer to take effect.  Take an antacid 30 to 60 minutes after eating and at bedtime, but not at the same time as an acid blocker.Try not to take medicines such as ibuprofen and aspirin. If you are taking aspirin for your heart or other medical reasons, talk to your healthcare provider about stopping it.  Follow-up care  Follow up with your healthcare provider or as advised by our staff.  When to seek medical advice  Call your healthcare provider if any of the following occur:    Stomach pain gets worse or moves to the lower right abdomen (appendix area)    Chest pain appears or gets worse, or spreads to the back, neck, shoulder, or arm    An over-the-counter trial of medicine doesn't relieve your symptoms    Weight loss that can't be explained    Trouble or pain swallowing3 mo    Frequent vomiting (can t keep down liquids)    Blood in the stool or vomit (red or black in color)    Feeling weak or dizzy    Fever of 100.4 F (38 C) or higher, or as directed by your healthcare provider  Date Last Reviewed: 3/1/2018    6827-9053 [employer]. [address] [place] [address], [place] [address]. All rights reserved. This  information is not intended as a substitute for professional medical care. Always follow your healthcare professional's instructions.    TAKE THE OMEPRAZOLE AFTER YOU MAKE THE STOOL SAMPLE  TAKE IT 30-60 MINUTES BEFORE A MEAL    - calcium carbonate (TUMS) 500 MG chewable tablet; Take 1-2 tablets (500-1,000 mg) by mouth 3 times daily as needed for heartburn  Dispense: 100 tablet; Refill: 0  - Helicobacter pylori Antigen Stool; Future  - simethicone (MYLICON) 80 MG chewable tablet; Take 1 tablet (80 mg) by mouth 3 times daily as needed (gas)  Dispense: 15 tablet; Refill: 0  FOR THE BELLY PAIN, COME BACK IN 2-3 WEEKS     6. Mild persistent asthma without complication  DO THE TEST, COME BACK TO CLINIC AFTER THE RESULTS  - Spirometry Pre/Post (Bronchodilation Response); Future    Follow up plan  FOLLOW UP AS I SAID BEFORE    You are due for Immunization(s) TDAP,  And Influenza yearly and complete physical exam. Please call the  at [phone] to schedule an appointment.       Thank you for coming to [facility] today!  Liliana Calhoun MD   >>>>> <<<<<  If you had laboratory testing and results need quick action we will call you at # [phone] (home) . If this is not the best number, please call our clinic or stop by the  to update your contact information.  If you need any refills please call your pharmacy and they will contact us. If you need to  your refill at a new pharmacy, please contact the new pharmacy directly. The new pharmacy will help you get your medications transferred faster.   If you have any concerns about today's visit or wish to schedule another appointment, please call our office during normal business hours [phone] (8-5:00 M-F)  If a referral was made to a [facility] and you don't get a call from central scheduling, please call [phone].  If a mammogram was ordered, call [facility] at [phone] to schedule or change your  appointment.  If you had an XRay/CT/Ultrasound/MRI ordered, the number is [phone] to schedule or change your radiology appointment.     If you have urgent medical concerns please call [phone] at any time of the day.        Awake/Alert/Cooperative

## 2023-03-15 DIAGNOSIS — F50.89 OTHER DISORDER OF EATING: ICD-10-CM

## 2023-03-15 DIAGNOSIS — Z86.59 HISTORY OF MOOD DISORDER: ICD-10-CM

## 2023-03-15 RX ORDER — FLUOXETINE 10 MG/1
CAPSULE ORAL
Qty: 60 CAPSULE | Refills: 1 | Status: SHIPPED | OUTPATIENT
Start: 2023-03-15 | End: 2024-08-20

## 2023-03-15 NOTE — TELEPHONE ENCOUNTER
"Request for medication refill:  FLUoxetine (PROZAC) 10 MG capsule  FLUoxetine (PROZAC) 20 MG capsule    Providers if patient needs an appointment and you are willing to give a one month supply please refill for one month and  send a letter/MyChart using \".SMILLIMITEDREFILL\" .smillimited and route chart to \"P [facility] \" (Giving one month refill in non controlled medications is strongly recommended before denial)    If refill has been denied, meaning absolutely no refills without visit, please complete the smart phrase \".smirxrefuse\" and route it to the \"P S[facility] MED REFILLS\"  pool to inform the patient and the pharmacy.    Ethan Shea MA        "

## 2024-02-22 ENCOUNTER — TELEPHONE (OUTPATIENT)
Dept: FAMILY MEDICINE | Facility: CLINIC | Age: 24
End: 2024-02-22
Payer: COMMERCIAL

## 2024-02-22 NOTE — TELEPHONE ENCOUNTER
Care Coordinator attempted to reach out to the patient to discuss scheduling a follow up visit, as it has been awhile since patient was last seen,11/8/22. And also to verify if patient changed Primary Care Clinics. However, telephone would ring and then go right to a busy signal (x's 2). CC sending letter to patient home.    Neelima Solis  Lead Care Coordinator  [facility]  [phone]

## 2024-06-23 ENCOUNTER — HEALTH MAINTENANCE LETTER (OUTPATIENT)
Age: 24
End: 2024-06-23

## 2024-08-13 NOTE — PROGRESS NOTES
Assessment & Plan     ED follow-up  Chronic constipation  Anal fissure  BRBPR (bright red blood per rectum)Underweight  History of disordered eating  History of MDD  - Has had softer BM's since ED but they are very hard to pass. Still some red blood and pain with stooling only.   - Has issues with many stool softeners/laxatives that she has tried and have either helped temporarily or not at all. Has not been taking Dulcolax because she thinks it doesn't work for her and does not want to take Miralax any more - struggles with drinking it.Would like to try combined stool softener/laxative in pill form. Was also referred to GI in ED.   - Has insight into the fact that her diet and contributes to her constipation. Is trying to drink more water throughout the day. Low fiber intake. Mentions making protein smoothies some mornings  - Did not endorse significant depression symptoms this visit. Was on Prozac in past, not sure for how long and when she discontinued. Mental health and unclear history of ?restrictive vs other eating disorder need more attention at follow-up. Weight is essentially the same as it was in 2022.  - TSH with free T4 reflex  - Comprehensive metabolic panel  - CBC with Platelets and Reflex to Iron Studies  - trial senna-docusate (SENOKOT-S/PERICOLACE) 8.6-50 MG tablet  2 tabs qhs    History of iron deficiency  History of menorrhagia   History of irregular menses  Urinary urgency and frequency  - Without dysuria or hematuria. No vaginal symptoms  - Mom present throughout visit but does not speak English. Cierra declined offer for meeting privately and denied current sexual activity or need for UPT. Further chart review after visit reveals an UC visit on 6/28/24 for STI testing following unprotected vaginal intercourse. She reported taking Plan B the next day and had a period starting 6/30/24. All STI results neg except for incidental yeast vaginitis. No UPT done. Her current LMP is still 6/30/24 and  she does have irregular periods. Did not ask about other early pregnancy symptoms today and did not have consent to add UPT to labs today. Should get at follow-up if still no period.  - CBC with Platelets and Reflex to Iron Studies  - UA Macroscopic with reflex to Microscopic and Culture  - UA Microscopic with Reflex to Culture    Mild persistent asthma  -not addressed this visit. We have not refilled inhalers for her since 2022.    Follow-up to review results.  Multiple care gaps, declines vaccines. Revisit.    The longitudinal plan of care for the diagnosis(es)/condition(s) as documented were addressed during this visit. Due to the added complexity in care, I will continue to support Cierra in the subsequent management and with ongoing continuity of care.  ----------------------------------------------------  ADDENDUM  Results confirm iron deficiency anemia. Reported intolerance to oral iron. Have offered Venofer infusions, waiting to hear back.   TSH, CMP WNL with exception of mildly low creatinine. Serum Albumin is normal.    KIMBERLYN Norton is a 24 year old, presenting for the following health issues:  Rectal Problem (In emergency rm  7/27. Blood in the stool for about year. Tears in anal area from pushing) and urine problem    HPI   Last seen here in 2022. Was in [place] for extended period in between. PMH sig for MDD, disordered eating NOS, iron def anemia, irregular periods with menorrhagia, mild persistent asthma    ED/UC Followup:  Facility:  [facility] ED  Date of visit: 7/26/24  Reason for visit: rectal bleeding  Current Status: same    From ED notes  - Reports that for about the past year she has been experiencing painful, hard bowel movements followed by bright red blood. She states that in the past month she has been noticing it getting worse and it is harder to hold it in. She notes that she has taken stool softeners before and they help for a few days but then it returns back to hard stool. She  states that she stays hydrated. She denies any abdominal pain, nausea, vomiting, dizziness, lightheadedness, fever, or diarrhea.   - No hemorrhoids, small anal fissure on exam.. Neg internal rectal exam. Rx for Miralax and Docusate.    Additional history  - Typically has BM 2-3/week. Has to strain and bend forward, sometimes feels like she will pass out. No stool softener or laxative use. Has rectal pain with BM only. Only bleeds with BM. No abd pain.  - For last week has been putting Miralax into morning protein shake. Is not using docusate.- says she used in past and it didn't do anything.    - Urinary urgency and frequency x last 2 weeks. No dysuria.     Review of Systems   Constitutional:  Negative for chills and fever.        Wt Readings from Last 4 Encounters:  08/14/24 : 44.5 kg (98 lb 3.2 oz)  11/08/22 : 45.8 kg (101 lb)  08/31/22 : 47.1 kg (103 lb 12.8 oz)  04/05/22 : 44.6 kg (98 lb 6.4 oz)      Respiratory: Negative.     Cardiovascular: Negative.    Gastrointestinal:  Negative for diarrhea, nausea and vomiting.   Genitourinary:  Negative for pelvic pain and vaginal discharge.   Neurological:  Positive for weakness. Negative for dizziness, syncope and light-headedness.        Vague description of feeling weak at times.    Hematological:  Negative for adenopathy. Does not bruise/bleed easily.        No bleeding from anywhere other than with BMs.           PHQ-2 Score:       8/14/2024     1:59 PM 11/8/2022    11:18 AM   PHQ-2 ( 1999 [employer])   Q1: Little interest or pleasure in doing things 0 0   Q2: Feeling down, depressed or hopeless 0 0   PHQ-2 Score 0 0   Q1: Little interest or pleasure in doing things Not at all    Q2: Feeling down, depressed or hopeless Not at all    PHQ-2 Score 0           3/15/2022     9:42 AM 8/31/2022     9:59 AM 8/14/2024     1:59 PM   PHQ   PHQ-9 Total Score 15 9 6   Q9: Thoughts of better off dead/self-harm past 2 weeks Not at all Not at all Not at all            Objective    BP  "103/68 (BP Location: Left arm, Patient Position: Sitting, Cuff Size: Adult Regular)   Pulse 65   Temp 98.5  F (36.9  C) (Oral)   Resp 12   Ht 1.6 m (5' 3\")   Wt 44.5 kg (98 lb 3.2 oz)   LMP 06/30/2024 (Exact Date)   SpO2 100%   BMI 17.40 kg/m       Physical Exam  Constitutional:       General: She is not in acute distress.     Comments: thin   HENT:      Mouth/Throat:      Mouth: Mucous membranes are moist.   Eyes:      Conjunctiva/sclera: Conjunctivae normal.   Neck:      Comments: Thyroid prominent, symmetric and smooth  Cardiovascular:      Rate and Rhythm: Normal rate and regular rhythm.   Pulmonary:      Effort: Pulmonary effort is normal.      Breath sounds: Normal breath sounds.   Abdominal:      General: Bowel sounds are normal. There is no distension.      Palpations: There is no mass.      Tenderness: There is no abdominal tenderness. There is no right CVA tenderness, left CVA tenderness, guarding or rebound.   Lymphadenopathy:      Cervical: No cervical adenopathy.   Skin:     Capillary Refill: Capillary refill takes less than 2 seconds.   Neurological:      Mental Status: She is alert.          Results for orders placed or performed in visit on 08/14/24   TSH with free T4 reflex     Status: Normal   Result Value Ref Range    TSH 1.08 0.30 - 4.20 uIU/mL   UA Macroscopic with reflex to Microscopic and Culture     Status: Abnormal    Specimen: Urine, Midstream   Result Value Ref Range    Color Urine Yellow Colorless, Straw, Light Yellow, Yellow    Appearance Urine Clear Clear    Glucose Urine Negative Negative mg/dL    Bilirubin Urine Negative Negative    Ketones Urine Negative Negative mg/dL    Specific Gravity Urine 1.015 1.005 - 1.030    Blood Urine Negative Negative    pH Urine 7.0 5.0 - 8.0    Protein Albumin Urine Negative Negative mg/dL    Urobilinogen Urine 0.2 0.2, 1.0 E.U./dL    Nitrite Urine Negative Negative    Leukocyte Esterase Urine Small (A) Negative   Comprehensive metabolic panel   "   Status: Abnormal   Result Value Ref Range    Sodium 138 135 - 145 mmol/L    Potassium 4.2 3.4 - 5.3 mmol/L    Carbon Dioxide (CO2) 26 22 - 29 mmol/L    Anion Gap 10 7 - 15 mmol/L    Urea Nitrogen 10.9 6.0 - 20.0 mg/dL    Creatinine 0.48 (L) 0.51 - 0.95 mg/dL    GFR Estimate >90 >60 mL/min/1.73m2    Calcium 9.9 8.8 - 10.4 mg/dL    Chloride 102 98 - 107 mmol/L    Glucose 83 70 - 99 mg/dL    Alkaline Phosphatase 86 40 - 150 U/L    AST 24 0 - 45 U/L    ALT 13 0 - 50 U/L    Protein Total 7.6 6.4 - 8.3 g/dL    Albumin 4.7 3.5 - 5.2 g/dL    Bilirubin Total 0.4 <=1.2 mg/dL   CBC with Platelets and Reflex to Iron Studies     Status: Abnormal   Result Value Ref Range    WBC Count 5.2 4.0 - 11.0 10e3/uL    RBC Count 5.01 3.80 - 5.20 10e6/uL    Hemoglobin 10.3 (L) 11.7 - 15.7 g/dL    Hematocrit 35.1 35.0 - 47.0 %    MCV 70 (L) 78 - 100 fL    MCH 20.6 (L) 26.5 - 33.0 pg    MCHC 29.3 (L) 31.5 - 36.5 g/dL    RDW 18.1 (H) 10.0 - 15.0 %    Platelet Count 440 150 - 450 10e3/uL   Extra Green Top (Lithium Heparin) Tube     Status: None   Result Value Ref Range    Hold Specimen [facility]    Iron & Iron Binding Capacity     Status: Abnormal   Result Value Ref Range    Iron 19 (L) 37 - 145 ug/dL    Iron Binding Capacity 361 240 - 430 ug/dL    Iron Sat Index 5 (L) 15 - 46 %   Ferritin     Status: Abnormal   Result Value Ref Range    Ferritin 5 (L) 6 - 175 ng/mL   UA Microscopic with Reflex to Culture     Status: Abnormal   Result Value Ref Range    Bacteria Urine None Seen None Seen /HPF    RBC Urine None Seen 0-2 /HPF /HPF    WBC Urine 0-5 0-5 /HPF /HPF    Squamous Epithelials Urine Few (A) None Seen /LPF    Narrative    Urine Culture not indicated   CBC with Platelets and Reflex to Iron Studies     Status: Abnormal    Narrative    The following orders were created for panel order CBC with Platelets and Reflex to Iron Studies.  Procedure                               Abnormality         Status                     ---------                                -----------         ------                     CBC with Platelets and R...[[id number]]  Abnormal            Final result               Extra Green Top (Lithium...[[id number]]                      Final result                 Please view results for these tests on the individual orders.           Signed Electronically by: Erika Ely MD    Answers submitted by the patient for this visit:  Patient Health Questionnaire (Submitted on 8/14/2024)  If you checked off any problems, how difficult have these problems made it for you to do your work, take care of things at home, or get along with other people?: Not difficult at all  PHQ9 TOTAL SCORE: 6

## 2024-08-14 ENCOUNTER — OFFICE VISIT (OUTPATIENT)
Dept: FAMILY MEDICINE | Facility: CLINIC | Age: 24
End: 2024-08-14
Payer: COMMERCIAL

## 2024-08-14 VITALS
OXYGEN SATURATION: 100 % | SYSTOLIC BLOOD PRESSURE: 103 MMHG | RESPIRATION RATE: 12 BRPM | HEIGHT: 63 IN | DIASTOLIC BLOOD PRESSURE: 68 MMHG | TEMPERATURE: 98.5 F | HEART RATE: 65 BPM | BODY MASS INDEX: 17.4 KG/M2 | WEIGHT: 98.2 LBS

## 2024-08-14 DIAGNOSIS — K60.2 ANAL FISSURE: ICD-10-CM

## 2024-08-14 DIAGNOSIS — K59.09 CHRONIC CONSTIPATION: ICD-10-CM

## 2024-08-14 DIAGNOSIS — J45.30 MILD PERSISTENT ASTHMA, UNSPECIFIED WHETHER COMPLICATED: Primary | ICD-10-CM

## 2024-08-14 DIAGNOSIS — L29.0 RECTAL ITCHING: ICD-10-CM

## 2024-08-14 DIAGNOSIS — R63.6 UNDERWEIGHT: ICD-10-CM

## 2024-08-14 DIAGNOSIS — R39.15 URINARY URGENCY: ICD-10-CM

## 2024-08-14 DIAGNOSIS — K62.5 BRBPR (BRIGHT RED BLOOD PER RECTUM): ICD-10-CM

## 2024-08-14 LAB
ALBUMIN SERPL BCG-MCNC: 4.7 G/DL (ref 3.5–5.2)
ALBUMIN UR-MCNC: NEGATIVE MG/DL
ALP SERPL-CCNC: 86 U/L (ref 40–150)
ALT SERPL W P-5'-P-CCNC: 13 U/L (ref 0–50)
ANION GAP SERPL CALCULATED.3IONS-SCNC: 10 MMOL/L (ref 7–15)
APPEARANCE UR: CLEAR
AST SERPL W P-5'-P-CCNC: 24 U/L (ref 0–45)
BACTERIA #/AREA URNS HPF: ABNORMAL /HPF
BILIRUB SERPL-MCNC: 0.4 MG/DL
BILIRUB UR QL STRIP: NEGATIVE
BUN SERPL-MCNC: 10.9 MG/DL (ref 6–20)
CALCIUM SERPL-MCNC: 9.9 MG/DL (ref 8.8–10.4)
CHLORIDE SERPL-SCNC: 102 MMOL/L (ref 98–107)
COLOR UR AUTO: YELLOW
CREAT SERPL-MCNC: 0.48 MG/DL (ref 0.51–0.95)
EGFRCR SERPLBLD CKD-EPI 2021: >90 ML/MIN/1.73M2
ERYTHROCYTE [DISTWIDTH] IN BLOOD BY AUTOMATED COUNT: 18.1 % (ref 10–15)
GLUCOSE SERPL-MCNC: 83 MG/DL (ref 70–99)
GLUCOSE UR STRIP-MCNC: NEGATIVE MG/DL
HCO3 SERPL-SCNC: 26 MMOL/L (ref 22–29)
HCT VFR BLD AUTO: 35.1 % (ref 35–47)
HGB BLD-MCNC: 10.3 G/DL (ref 11.7–15.7)
HGB UR QL STRIP: NEGATIVE
HOLD SPECIMEN: NORMAL
KETONES UR STRIP-MCNC: NEGATIVE MG/DL
LEUKOCYTE ESTERASE UR QL STRIP: ABNORMAL
MCH RBC QN AUTO: 20.6 PG (ref 26.5–33)
MCHC RBC AUTO-ENTMCNC: 29.3 G/DL (ref 31.5–36.5)
MCV RBC AUTO: 70 FL (ref 78–100)
NITRATE UR QL: NEGATIVE
PH UR STRIP: 7 [PH] (ref 5–8)
PLATELET # BLD AUTO: 440 10E3/UL (ref 150–450)
POTASSIUM SERPL-SCNC: 4.2 MMOL/L (ref 3.4–5.3)
PROT SERPL-MCNC: 7.6 G/DL (ref 6.4–8.3)
RBC # BLD AUTO: 5.01 10E6/UL (ref 3.8–5.2)
RBC #/AREA URNS AUTO: ABNORMAL /HPF
SODIUM SERPL-SCNC: 138 MMOL/L (ref 135–145)
SP GR UR STRIP: 1.01 (ref 1–1.03)
SQUAMOUS #/AREA URNS AUTO: ABNORMAL /LPF
TSH SERPL DL<=0.005 MIU/L-ACNC: 1.08 UIU/ML (ref 0.3–4.2)
UROBILINOGEN UR STRIP-ACNC: 0.2 E.U./DL
WBC # BLD AUTO: 5.2 10E3/UL (ref 4–11)
WBC #/AREA URNS AUTO: ABNORMAL /HPF

## 2024-08-14 PROCEDURE — 83550 IRON BINDING TEST: CPT | Performed by: FAMILY MEDICINE

## 2024-08-14 PROCEDURE — 99214 OFFICE O/P EST MOD 30 MIN: CPT | Performed by: FAMILY MEDICINE

## 2024-08-14 PROCEDURE — 82728 ASSAY OF FERRITIN: CPT | Performed by: FAMILY MEDICINE

## 2024-08-14 PROCEDURE — 81001 URINALYSIS AUTO W/SCOPE: CPT | Performed by: FAMILY MEDICINE

## 2024-08-14 PROCEDURE — 83540 ASSAY OF IRON: CPT | Performed by: FAMILY MEDICINE

## 2024-08-14 PROCEDURE — 85027 COMPLETE CBC AUTOMATED: CPT | Performed by: FAMILY MEDICINE

## 2024-08-14 PROCEDURE — 36415 COLL VENOUS BLD VENIPUNCTURE: CPT | Performed by: FAMILY MEDICINE

## 2024-08-14 PROCEDURE — 80053 COMPREHEN METABOLIC PANEL: CPT | Performed by: FAMILY MEDICINE

## 2024-08-14 PROCEDURE — 84443 ASSAY THYROID STIM HORMONE: CPT | Performed by: FAMILY MEDICINE

## 2024-08-14 RX ORDER — AMOXICILLIN 250 MG
2 CAPSULE ORAL DAILY
Qty: 60 TABLET | Refills: 3 | Status: SHIPPED | OUTPATIENT
Start: 2024-08-14

## 2024-08-14 RX ORDER — BENZOCAINE/MENTHOL 6 MG-10 MG
LOZENGE MUCOUS MEMBRANE 2 TIMES DAILY
Qty: 120 G | Refills: 3 | Status: SHIPPED | OUTPATIENT
Start: 2024-08-14

## 2024-08-14 ASSESSMENT — ASTHMA QUESTIONNAIRES
QUESTION_2 LAST FOUR WEEKS HOW OFTEN HAVE YOU HAD SHORTNESS OF BREATH: ONCE OR TWICE A WEEK
QUESTION_1 LAST FOUR WEEKS HOW MUCH OF THE TIME DID YOUR ASTHMA KEEP YOU FROM GETTING AS MUCH DONE AT WORK, SCHOOL OR AT HOME: MOST OF THE TIME
QUESTION_3 LAST FOUR WEEKS HOW OFTEN DID YOUR ASTHMA SYMPTOMS (WHEEZING, COUGHING, SHORTNESS OF BREATH, CHEST TIGHTNESS OR PAIN) WAKE YOU UP AT NIGHT OR EARLIER THAN USUAL IN THE MORNING: ONCE OR TWICE
ACT_TOTALSCORE: 17
QUESTION_5 LAST FOUR WEEKS HOW WOULD YOU RATE YOUR ASTHMA CONTROL: SOMEWHAT CONTROLLED
ACT_TOTALSCORE: 17
QUESTION_4 LAST FOUR WEEKS HOW OFTEN HAVE YOU USED YOUR RESCUE INHALER OR NEBULIZER MEDICATION (SUCH AS ALBUTEROL): ONCE A WEEK OR LESS
EMERGENCY_ROOM_LAST_YEAR_TOTAL: ONE

## 2024-08-14 ASSESSMENT — PATIENT HEALTH QUESTIONNAIRE - PHQ9
SUM OF ALL RESPONSES TO PHQ QUESTIONS 1-9: 6
10. IF YOU CHECKED OFF ANY PROBLEMS, HOW DIFFICULT HAVE THESE PROBLEMS MADE IT FOR YOU TO DO YOUR WORK, TAKE CARE OF THINGS AT HOME, OR GET ALONG WITH OTHER PEOPLE: NOT DIFFICULT AT ALL
SUM OF ALL RESPONSES TO PHQ QUESTIONS 1-9: 6

## 2024-08-15 LAB
FERRITIN SERPL-MCNC: 5 NG/ML (ref 6–175)
IRON BINDING CAPACITY (ROCHE): 361 UG/DL (ref 240–430)
IRON SATN MFR SERPL: 5 % (ref 15–46)
IRON SERPL-MCNC: 19 UG/DL (ref 37–145)

## 2024-08-20 ASSESSMENT — ENCOUNTER SYMPTOMS
CARDIOVASCULAR NEGATIVE: 1
DIZZINESS: 0
WEAKNESS: 1
BRUISES/BLEEDS EASILY: 0
FEVER: 0
LIGHT-HEADEDNESS: 0
NAUSEA: 0
CHILLS: 0
DIARRHEA: 0
ADENOPATHY: 0
RESPIRATORY NEGATIVE: 1
VOMITING: 0

## 2024-08-21 ENCOUNTER — TELEPHONE (OUTPATIENT)
Dept: GASTROENTEROLOGY | Facility: CLINIC | Age: 24
End: 2024-08-21

## 2024-08-21 NOTE — TELEPHONE ENCOUNTER
Diagnosis, Referred by & from: Chronic Constipation, Anal Fissure, BRBPR, Underweight   Appt date: 9/23/2024   NOTES STATUS DETAILS   OFFICE NOTE from referring provider Internal [facility]:  8/14/24 - [facility] OV with Dr. Ely   OFFICE NOTE from other specialist Internal [facility]:  11/8/22 - [facility] OV with Dr. Wu   DISCHARGE SUMMARY from hospital N/A    [facility]  7/26/24 - ED OV with Dr. Ruffin   OPERATIVE REPORT N/A    MEDICATION LIST Internal    LABS N/A    DIAGNOSTIC PROCEDURES N/A    IMAGING (DISC & REPORT) N/A

## 2024-08-21 NOTE — TELEPHONE ENCOUNTER
[facility] Call Center    Phone Message    May a detailed message be left on voicemail: Yes    Reason for Call: Other: Patient is currently scheduled on 11/12/2024, as visit type New GI Urgent. This is outside the expected timeline for this referral. Patient has been added to the waitlist.      Action Taken: Message routed to:  Other: GI REFERRAL TRIAGE POOL     Travel Screening: Not Applicable

## 2024-08-28 NOTE — TELEPHONE ENCOUNTER
Clinic Navigator sent a [email or web address] message to inform the Pt that Pt is on the wait list for a sooner appointment. Clinic Navigator will reach out to the Pt via phone call or [employer] when a sooner appointment becomes available.

## 2024-09-10 ENCOUNTER — MYC REFILL (OUTPATIENT)
Dept: FAMILY MEDICINE | Facility: CLINIC | Age: 24
End: 2024-09-10
Payer: COMMERCIAL

## 2024-09-10 ENCOUNTER — TELEPHONE (OUTPATIENT)
Dept: GASTROENTEROLOGY | Facility: CLINIC | Age: 24
End: 2024-09-10
Payer: COMMERCIAL

## 2024-09-10 DIAGNOSIS — J45.30 MILD PERSISTENT ASTHMA WITHOUT COMPLICATION: Chronic | ICD-10-CM

## 2024-09-10 DIAGNOSIS — K59.09 CHRONIC CONSTIPATION: Primary | ICD-10-CM

## 2024-09-10 DIAGNOSIS — J45.40 MODERATE PERSISTENT ASTHMA WITHOUT COMPLICATION: ICD-10-CM

## 2024-09-10 RX ORDER — BUDESONIDE AND FORMOTEROL FUMARATE DIHYDRATE 80; 4.5 UG/1; UG/1
2 AEROSOL RESPIRATORY (INHALATION) 2 TIMES DAILY
Qty: 10.2 G | Refills: 1 | Status: SHIPPED | OUTPATIENT
Start: 2024-09-10

## 2024-09-10 RX ORDER — ALBUTEROL SULFATE 90 UG/1
2 AEROSOL, METERED RESPIRATORY (INHALATION) EVERY 4 HOURS PRN
Qty: 18 G | Refills: 3 | Status: SHIPPED | OUTPATIENT
Start: 2024-09-10

## 2024-09-10 NOTE — TELEPHONE ENCOUNTER
"Request for medication refill:    albuterol (PROVENTIL HFA) 108 (90 Base) MCG/ACT inhaler     Providers if patient needs an appointment and you are willing to give a one month supply please refill for one month and  send a letter/MyChart using \".SMILLIMITEDREFILL\" .smillimited and route chart to \"P [facility] \" (Giving one month refill in non controlled medications is strongly recommended before denial)    If refill has been denied, meaning absolutely no refills without visit, please complete the smart phrase \".smirxrefuse\" and route it to the \"P SMI MED REFILLS\"  pool to inform the patient and the pharmacy.    Simi Mcdonald MA      "

## 2024-09-10 NOTE — TELEPHONE ENCOUNTER
"Request for medication refill:    budesonide-formoterol (SYMBICORT) 80-4.5 MCG/ACT Inhaler     Providers if patient needs an appointment and you are willing to give a one month supply please refill for one month and  send a letter/MyChart using \".SMILLIMITEDREFILL\" .smillimited and route chart to \"P [facility] \" (Giving one month refill in non controlled medications is strongly recommended before denial)    If refill has been denied, meaning absolutely no refills without visit, please complete the smart phrase \".smirxrefuse\" and route it to the \"P [facility] MED REFILLS\"  pool to inform the patient and the pharmacy.    Simi Mcdonald MA      "

## 2024-09-10 NOTE — TELEPHONE ENCOUNTER
"Endoscopy Scheduling Screen    Have you had a positive Covid test in the last 14 days?  No    What is your communication preference for Instructions and/or Bowel Prep?   MyChart    What insurance is in the chart?  Other:  [facility]    Ordering/Referring Provider:   CARYN LOCKE        (If ordering provider performs procedure, schedule with ordering provider unless otherwise instructed. )    BMI: Estimated body mass index is 17.4 kg/m  as calculated from the following:    Height as of 8/14/24: 1.6 m (5' 3\").    Weight as of 8/14/24: 44.5 kg (98 lb 3.2 oz).     Sedation Ordered  moderate sedation.   If patient BMI > 50 do not schedule in [facility].    If patient BMI > 45 do not schedule at [facility].    Are you taking methadone or Suboxone?  No    Have you had difficulties, pain, or discomfort during past endoscopy procedures?  No    Are you taking any prescription medications for pain 3 or more times per week?   NO, No RN review required.    Do you have a history of malignant hyperthermia?  No    (Females) Are you currently pregnant?   No     Have you been diagnosed or told you have pulmonary hypertension?   No    Do you have an LVAD?  No    Have you been told you have moderate to severe sleep apnea?  No    Have you been told you have COPD, asthma, or any other lung disease?  No    Do you have any heart conditions?  No     Have you ever had or are you waiting for an organ transplant?  No. Continue scheduling, no site restrictions.    Have you had a stroke or transient ischemic attack (TIA aka \"mini stroke\" in the last 6 months?   No    Have you been diagnosed with or been told you have cirrhosis of the liver?   No    Are you currently on dialysis?   No    Do you need assistance transferring?   No    BMI: Estimated body mass index is 17.4 kg/m  as calculated from the following:    Height as of 8/14/24: 1.6 m (5' 3\").    Weight as of 8/14/24: 44.5 kg (98 lb 3.2 oz).     Is patients BMI > 40 and scheduling location [facility]?  No    Do " you take an injectable medication for weight loss or diabetes (excluding insulin)?  No    Do you take the medication Naltrexone?  No    Do you take blood thinners?  No       Prep   Are you currently on dialysis or do you have chronic kidney disease?  No    Do you have a diagnosis of diabetes?  No    Do you have a diagnosis of cystic fibrosis (CF)?  No    On a regular basis do you go 3 -5 days between bowel movements?  Yes (Extended Prep)    BMI > 40?  No    Preferred Pharmacy:        [facility][id number] - [address]  [place] [address]  Phone: [phone] Fax: [phone]          Final Scheduling Details     Procedure scheduled  Colonoscopy    Surgeon:  Arabella     Date of procedure:  10/11     Pre-OP / PAC:   No - Not required for this site.    Location  [facility] - [facility] - Patient preference.    Sedation   Moderate Sedation - Per order.      Patient Reminders:   You will receive a call from a Nurse to review instructions and health history.  This assessment must be completed prior to your procedure.  Failure to complete the Nurse assessment may result in the procedure being cancelled.      On the day of your procedure, please designate an adult(s) who can drive you home stay with you for the next 24 hours. The medicines used in the exam will make you sleepy. You will not be able to drive.      You cannot take public transportation, ride share services, or non-medical taxi service without a responsible caregiver.  Medical transport services are allowed with the requirement that a responsible caregiver will receive you at your destination.  We require that drivers and caregivers are confirmed prior to your procedure.

## 2024-09-20 RX ORDER — BISACODYL 5 MG/1
TABLET, DELAYED RELEASE ORAL
Qty: 4 TABLET | Refills: 0 | Status: SHIPPED | OUTPATIENT
Start: 2024-09-20

## 2024-09-20 NOTE — TELEPHONE ENCOUNTER
Extended Golytely Bowel Prep  recommended due to constipation noted or reported.  Instructions were sent via [employer]ocal. Bowel prep was sent 9/20/2024 to      [id number] - [place], [place] - [address] AT [facility]

## 2024-09-23 ENCOUNTER — OFFICE VISIT (OUTPATIENT)
Dept: SURGERY | Facility: CLINIC | Age: 24
End: 2024-09-23
Payer: COMMERCIAL

## 2024-09-23 ENCOUNTER — PRE VISIT (OUTPATIENT)
Dept: SURGERY | Facility: CLINIC | Age: 24
End: 2024-09-23

## 2024-09-23 VITALS
OXYGEN SATURATION: 100 % | BODY MASS INDEX: 17.65 KG/M2 | DIASTOLIC BLOOD PRESSURE: 88 MMHG | WEIGHT: 99.6 LBS | HEART RATE: 71 BPM | HEIGHT: 63 IN | SYSTOLIC BLOOD PRESSURE: 130 MMHG

## 2024-09-23 DIAGNOSIS — K59.09 OTHER CONSTIPATION: ICD-10-CM

## 2024-09-23 DIAGNOSIS — K60.2 ANAL FISSURE: Primary | ICD-10-CM

## 2024-09-23 PROCEDURE — 99203 OFFICE O/P NEW LOW 30 MIN: CPT | Performed by: NURSE PRACTITIONER

## 2024-09-23 ASSESSMENT — PAIN SCALES - GENERAL: PAINLEVEL: NO PAIN (0)

## 2024-09-23 NOTE — NURSING NOTE
"Chief Complaint   Patient presents with    Consult     Constipation, anal fissure       Vitals:    09/23/24 1436   BP: 130/88   BP Location: Left arm   Patient Position: Sitting   Cuff Size: Adult Regular   Pulse: 71   SpO2: 100%   Weight: 99 lb 9.6 oz   Height: 5' 3\"       Body mass index is 17.64 kg/m .    Susan Osullivan, EMT  "
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
## 2024-09-30 ENCOUNTER — TELEPHONE (OUTPATIENT)
Dept: GASTROENTEROLOGY | Facility: CLINIC | Age: 24
End: 2024-09-30
Payer: COMMERCIAL

## 2024-10-01 NOTE — TELEPHONE ENCOUNTER
Pre assessment completed for upcoming procedure.   (Please see previous telephone encounter notes for complete details)      Procedure details:    Arrival time and facility location reviewed.    Pre op exam needed? No.    Designated  policy reviewed. Instructed to have someone stay 6  hours post procedure.       Medication review:    Fiber supplements: HOLD 7 days before procedure.      Prep for procedure:     Procedure prep instructions reviewed.        Any additional information needed:  N/A      Patient  verbalized understanding and had no questions or concerns at this time.      Alexandra Sun RN  Endoscopy Procedure Pre Assessment   [phone] option 2

## 2024-10-01 NOTE — TELEPHONE ENCOUNTER
Pre visit planning completed.      Procedure details:    Patient scheduled for Colonoscopy on 10/11/24.     Arrival time: 1300. Procedure time 1345    Facility location: [facility]; [address], [place] [address]. Check in location: 2nd Floor at Surgery desk.    Sedation type: Conscious sedation     Pre op exam needed? No.    Indication for procedure:   Chronic constipation [K59.09]      BRBPR (bright red blood per rectum) [K62.5]            Chart review:     Electronic implanted devices? No    Recent diagnosis of diverticulitis within the last 6 weeks? No      Medication review:    Diabetic? No    Anticoagulants? No    Weight loss medication/injectable? No GLP-1 medication per patient's medication list.  RN will verify with pre-assessment call.    Other medication HOLDING recommendations:  N/A      Prep for procedure:     Bowel prep recommendation: Extended Golytely. Bowel prep prescription sent to      [id number] - [place], [place] - [address] AT [facility]    Due to: constipation noted or reported.     Prep instructions sent via [employer]         Corinne Kliber, RN  Endoscopy Procedure Pre Assessment RN  [phone] option 2

## 2024-10-01 NOTE — TELEPHONE ENCOUNTER
Pt called back. Pt would like to keep the appointment as scheduled for 11/12/2024 for now. Pt mentioned that Pt has a scheduled colonoscopy on 10/11/2024 and would like to have that completed first.

## 2024-10-09 NOTE — TELEPHONE ENCOUNTER
Incoming call received from pt asking to go over the colon prep steps again.  Pt was reminded of NPO times and reminded to not eat anything today.  Pt did eat after 1pm, but nothing after 2pm was eaten.     Pt stated their understanding and have no further questions or concerns at this time.      Monserrat Joyce, RN

## 2024-10-11 ENCOUNTER — HOSPITAL ENCOUNTER (OUTPATIENT)
Facility: AMBULATORY SURGERY CENTER | Age: 24
Discharge: HOME | End: 2024-10-11
Attending: INTERNAL MEDICINE | Admitting: INTERNAL MEDICINE
Payer: COMMERCIAL

## 2024-10-11 VITALS
DIASTOLIC BLOOD PRESSURE: 79 MMHG | OXYGEN SATURATION: 100 % | HEART RATE: 57 BPM | TEMPERATURE: 97.3 F | SYSTOLIC BLOOD PRESSURE: 127 MMHG | RESPIRATION RATE: 16 BRPM

## 2024-10-11 LAB — COLONOSCOPY: NORMAL

## 2024-10-11 PROCEDURE — G8918 PT W/O PREOP ORDER IV AB PRO: HCPCS

## 2024-10-11 PROCEDURE — G8907 PT DOC NO EVENTS ON DISCHARG: HCPCS

## 2024-10-11 PROCEDURE — 45378 DIAGNOSTIC COLONOSCOPY: CPT

## 2024-10-11 RX ORDER — NALOXONE HYDROCHLORIDE 0.4 MG/ML
0.4 INJECTION, SOLUTION INTRAMUSCULAR; INTRAVENOUS; SUBCUTANEOUS
Status: DISCONTINUED | OUTPATIENT
Start: 2024-10-11 | End: 2024-10-12 | Stop reason: HOSPADM

## 2024-10-11 RX ORDER — ONDANSETRON 2 MG/ML
4 INJECTION INTRAMUSCULAR; INTRAVENOUS EVERY 6 HOURS PRN
Status: DISCONTINUED | OUTPATIENT
Start: 2024-10-11 | End: 2024-10-12 | Stop reason: HOSPADM

## 2024-10-11 RX ORDER — LIDOCAINE 40 MG/G
CREAM TOPICAL
Status: DISCONTINUED | OUTPATIENT
Start: 2024-10-11 | End: 2024-10-12 | Stop reason: HOSPADM

## 2024-10-11 RX ORDER — PROCHLORPERAZINE MALEATE 10 MG
10 TABLET ORAL EVERY 6 HOURS PRN
Status: DISCONTINUED | OUTPATIENT
Start: 2024-10-11 | End: 2024-10-12 | Stop reason: HOSPADM

## 2024-10-11 RX ORDER — NALOXONE HYDROCHLORIDE 0.4 MG/ML
0.2 INJECTION, SOLUTION INTRAMUSCULAR; INTRAVENOUS; SUBCUTANEOUS
Status: DISCONTINUED | OUTPATIENT
Start: 2024-10-11 | End: 2024-10-12 | Stop reason: HOSPADM

## 2024-10-11 RX ORDER — FENTANYL CITRATE 50 UG/ML
INJECTION, SOLUTION INTRAMUSCULAR; INTRAVENOUS PRN
Status: DISCONTINUED | OUTPATIENT
Start: 2024-10-11 | End: 2024-10-11 | Stop reason: HOSPADM

## 2024-10-11 RX ORDER — ONDANSETRON 2 MG/ML
4 INJECTION INTRAMUSCULAR; INTRAVENOUS
Status: DISCONTINUED | OUTPATIENT
Start: 2024-10-11 | End: 2024-10-12 | Stop reason: HOSPADM

## 2024-10-11 RX ORDER — FLUMAZENIL 0.1 MG/ML
0.2 INJECTION, SOLUTION INTRAVENOUS
Status: DISCONTINUED | OUTPATIENT
Start: 2024-10-11 | End: 2024-10-12 | Stop reason: HOSPADM

## 2024-10-11 RX ORDER — ONDANSETRON 4 MG/1
4 TABLET, ORALLY DISINTEGRATING ORAL EVERY 6 HOURS PRN
Status: DISCONTINUED | OUTPATIENT
Start: 2024-10-11 | End: 2024-10-12 | Stop reason: HOSPADM

## 2025-01-08 ENCOUNTER — OFFICE VISIT (OUTPATIENT)
Dept: FAMILY MEDICINE | Facility: CLINIC | Age: 25
End: 2025-01-08
Payer: COMMERCIAL

## 2025-01-08 VITALS
RESPIRATION RATE: 16 BRPM | DIASTOLIC BLOOD PRESSURE: 86 MMHG | TEMPERATURE: 98 F | BODY MASS INDEX: 17.72 KG/M2 | OXYGEN SATURATION: 100 % | SYSTOLIC BLOOD PRESSURE: 119 MMHG | HEART RATE: 66 BPM | WEIGHT: 100 LBS | HEIGHT: 63 IN

## 2025-01-08 DIAGNOSIS — L65.9 HAIR LOSS: ICD-10-CM

## 2025-01-08 DIAGNOSIS — H53.9 VISION CHANGES: Primary | ICD-10-CM

## 2025-01-08 DIAGNOSIS — J45.30 MILD PERSISTENT ASTHMA WITHOUT COMPLICATION: ICD-10-CM

## 2025-01-08 DIAGNOSIS — J45.30 MILD PERSISTENT ASTHMA, UNSPECIFIED WHETHER COMPLICATED: ICD-10-CM

## 2025-01-08 DIAGNOSIS — R23.8 DRY SCALP: ICD-10-CM

## 2025-01-08 DIAGNOSIS — Z71.85 VACCINE COUNSELING: ICD-10-CM

## 2025-01-08 LAB
ERYTHROCYTE [DISTWIDTH] IN BLOOD BY AUTOMATED COUNT: 18.3 % (ref 10–15)
FERRITIN SERPL-MCNC: 6 NG/ML (ref 6–175)
HCT VFR BLD AUTO: 33.8 % (ref 35–47)
HGB BLD-MCNC: 10.2 G/DL (ref 11.7–15.7)
HOLD SPECIMEN: NORMAL
IRON BINDING CAPACITY (ROCHE): 346 UG/DL (ref 240–430)
IRON SATN MFR SERPL: 5 % (ref 15–46)
IRON SERPL-MCNC: 16 UG/DL (ref 37–145)
MCH RBC QN AUTO: 21.4 PG (ref 26.5–33)
MCHC RBC AUTO-ENTMCNC: 30.2 G/DL (ref 31.5–36.5)
MCV RBC AUTO: 71 FL (ref 78–100)
PLATELET # BLD AUTO: 357 10E3/UL (ref 150–450)
RBC # BLD AUTO: 4.77 10E6/UL (ref 3.8–5.2)
TSH SERPL DL<=0.005 MIU/L-ACNC: 1.45 UIU/ML (ref 0.3–4.2)
WBC # BLD AUTO: 3.4 10E3/UL (ref 4–11)

## 2025-01-08 RX ORDER — BUDESONIDE AND FORMOTEROL FUMARATE DIHYDRATE 80; 4.5 UG/1; UG/1
AEROSOL RESPIRATORY (INHALATION)
Qty: 20.4 G | Refills: 11 | Status: SHIPPED | OUTPATIENT
Start: 2025-01-08

## 2025-01-08 ASSESSMENT — ASTHMA QUESTIONNAIRES
ACT_TOTALSCORE: 10
QUESTION_4 LAST FOUR WEEKS HOW OFTEN HAVE YOU USED YOUR RESCUE INHALER OR NEBULIZER MEDICATION (SUCH AS ALBUTEROL): THREE OR MORE TIMES PER DAY
EMERGENCY_ROOM_LAST_YEAR_TOTAL: TWO
QUESTION_1 LAST FOUR WEEKS HOW MUCH OF THE TIME DID YOUR ASTHMA KEEP YOU FROM GETTING AS MUCH DONE AT WORK, SCHOOL OR AT HOME: MOST OF THE TIME
ACT_TOTALSCORE: 10
QUESTION_5 LAST FOUR WEEKS HOW WOULD YOU RATE YOUR ASTHMA CONTROL: POORLY CONTROLLED
QUESTION_3 LAST FOUR WEEKS HOW OFTEN DID YOUR ASTHMA SYMPTOMS (WHEEZING, COUGHING, SHORTNESS OF BREATH, CHEST TIGHTNESS OR PAIN) WAKE YOU UP AT NIGHT OR EARLIER THAN USUAL IN THE MORNING: ONCE OR TWICE
QUESTION_2 LAST FOUR WEEKS HOW OFTEN HAVE YOU HAD SHORTNESS OF BREATH: MORE THAN ONCE A DAY

## 2025-01-08 ASSESSMENT — PATIENT HEALTH QUESTIONNAIRE - PHQ9
10. IF YOU CHECKED OFF ANY PROBLEMS, HOW DIFFICULT HAVE THESE PROBLEMS MADE IT FOR YOU TO DO YOUR WORK, TAKE CARE OF THINGS AT HOME, OR GET ALONG WITH OTHER PEOPLE: NOT DIFFICULT AT ALL
SUM OF ALL RESPONSES TO PHQ QUESTIONS 1-9: 3
SUM OF ALL RESPONSES TO PHQ QUESTIONS 1-9: 3

## 2025-01-08 NOTE — PROGRESS NOTES
Assessment & Plan     Mild persistent asthma  Start SMART   - budesonide-formoterol (SYMBICORT) 80-4.5 MCG/ACT Inhaler  Dispense: 20.4 g; Refill: 11  - SPIROMETRY, BREATHING CAPACITY    Hair loss  Dry scalp  - TSH with free T4 reflex  - CBC with Platelets and Reflex to Iron Studies  - Iron & Iron Binding Capacity  - Ferritin  - pyrithione zinc (SELSUN BLUE/HEAD AND SHOULDERS) 1 % external shampoo  Dispense: 400 mL; Refill: 1  - Adult Dermatology  Referral     Vision changes  nonspecific  - Adult Eye  Referral    Vaccine counseling  Declines all    Results via My Chart  Encouraged to schedule for CPE  Call if insurance issue with inhaler.      30 minutes spent by me on the date of the encounter doing chart review, review of outside records, review of test results, patient visit, and documentation       Subjective   Cierra is a 24 year old, presenting for the following health issues:  Asthma (Work restriction form), Hair/Scalp Problem (Hairloss), and Eye Problem      1/8/2025     9:58 AM   Additional Questions   Roomed by Ohn   Accompanied by Self         1/8/2025   Forms   Any forms needing to be completed Yes         1/8/2025    Information    services provided? No     HPI   3 things to discuss.    1) History of mild persistent asthma, no prior spirometry on record. Treated in past with Flovent and Albuterol. Biggest trigger has been cold air.     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 Was taken to ED at [facility]. Was ok by the time she was evaluated and instructed to follow-up with PCP.     Per Care Everywhere review, in ED she reported taking both controller and rescue inhalers but today reports that she has only been using the Albuterol rescue. Taking 2 puffs every morning for last month. Knows this isn't the best treatment and wants to address this and get a restrictions letter  "for work.     2) has noted significant hair loss. Most noticeable to her around sides of head. Scalp flaking but not itching. No hair extensions, wigs. Does not use chemicals or dye. Don't recall if she said she uses heated tools. Feels like she loses more when she is stressed. Denies hair pulling.    3) has noticed that she often has a change in her vision when driving at night. Gets blurry, see's shiny floaters? Doesn't always happen, resolves fairly quickly. No associated eye pain, redness, discharge, headaches. Has never needed glasses and denies issues with vision at any other time. Can't recall last eye exam.          Objective    /86   Pulse 66   Temp 98  F (36.7  C) (Oral)   Resp 16   Ht 1.6 m (5' 3\")   Wt 45.4 kg (100 lb)   LMP 12/05/2024   SpO2 100%   BMI 17.71 kg/m    Body mass index is 17.71 kg/m .  Physical Exam  Constitutional:       General: She is not in acute distress.     Appearance: She is not ill-appearing.      Comments: Thin/petite   HENT:      Head: Normocephalic and atraumatic.   Eyes:      Extraocular Movements: Extraocular movements intact.      Conjunctiva/sclera: Conjunctivae normal.      Pupils: Pupils are equal, round, and reactive to light.   Cardiovascular:      Rate and Rhythm: Normal rate and regular rhythm.      Heart sounds: Normal heart sounds.   Pulmonary:      Effort: Pulmonary effort is normal.      Breath sounds: Normal breath sounds.   Skin:     Comments: Thinning of hair diffusely, no patches of alopecia or lesions. Scalp flaking noted.   Neurological:      Mental Status: She is alert.                  Signed Electronically by: Erika Ely MD      Answers submitted by the patient for this visit:  Patient Health Questionnaire (Submitted on 1/8/2025)  If you checked off any problems, how difficult have these problems made it for you to do your work, take care of things at home, or get along with other people?: Not difficult at all  PHQ9 TOTAL SCORE: 3    "

## 2025-01-08 NOTE — LETTER
[address]  [address]  [facility] [address]  [phone]          January 8, 2025    RE:  Cierra Martinez                                                                                                                                                       [address] [facility] [address]            To whom it may concern:    Cierra Martinez is under my professional care for    Asthma    She may return to work with the following restrictions:  Minimize exposure to cold temperatures.   No outside work in winter months, no freezer/cooler exposure.       Sincerely,        Erika Ely MD

## 2025-01-23 NOTE — MR AVS SNAPSHOT
After Visit Summary   11/6/2018    Cierra Martinez    MRN: [id number]           Patient Information     Date Of Birth          2000        Visit Information        Provider Department      11/6/2018 11:20 AM Erika Ely MD [facility]        Today's Diagnoses     Immunization due    -  1    Abdominal pain, epigastric        Fatigue, unspecified type        Nausea        Thumb pain, right           Follow-ups after your visit        Follow-up notes from your care team     Return in about 4 weeks (around 12/4/2018).      Who to contact     Please call your clinic at [phone] to:    Ask questions about your health    Make or cancel appointments    Discuss your medicines    Learn about your test results    Speak to your doctor            Additional Information About Your Visit        MyChart Information     [employer] is an electronic gateway that provides easy, online access to your medical records. With [employer], you can request a clinic appointment, read your test results, renew a prescription or communicate with your care team.     To sign up for [employer], please contact your [facility] or call [phone] for assistance.           Care EveryWhere ID     This is your Care EveryWhere ID. This could be used by other organizations to access your [facility] medical records  [id number]        Your Vitals Were     Pulse Temperature Respirations Last Period Pulse Oximetry BMI (Body Mass Index)    61 98.2  F (36.8  C) (Oral) 16 10/17/2018 100% 21.93 kg/m2       Blood Pressure from Last 3 Encounters:   11/06/18 117/79   11/01/18 118/79   07/11/17 119/77    Weight from Last 3 Encounters:   11/06/18 123 lb 12.8 oz (56.2 kg) (51 %)*   11/01/18 123 lb (55.8 kg) (49 %)*   07/11/17 115 lb 14.4 oz (52.6 kg) (41 %)*     * Growth percentiles are based on CDC 2-20 Years data.              We Performed the Following     ADMIN VACCINE, EACH ADDITIONAL     ADMIN  VACCINE, INITIAL     FLU VAC PRESRV FREE QUAD SPLIT VIR IM, 0.5 mL dosage     HPV9 (Gardasil 9 )     MENINGOCOCCAL VACCINE,IM (Mentactra )        Primary Care Provider Office Phone # Fax #    Erika Ely [phone]       [address] [address]        Equal Access to Services     [facility]: Hadii aad ku hadasho Soomaali, waaxda luqadaha, qaybta kaalmada adeegyada, waxay idiin hayaan adeeg jacquidonellmariana laaida . So [facility] [phone].    ATENCIÓN: Si habla español, tiene a bates disposición servicios gratuitos de asistencia lingüística. Llame al [phone].    We comply with applicable federal civil rights laws and [place] laws. We do not discriminate on the basis of race, color, national origin, age, disability, sex, sexual orientation, or gender identity.            Thank you!     Thank you for choosing [facility]  for your care. Our goal is always to provide you with excellent care. Hearing back from our patients is one way we can continue to improve our services. Please take a few minutes to complete the written survey that you may receive in the mail after your visit with us. Thank you!             Your Updated Medication List - Protect others around you: Learn how to safely use, store and throw away your medicines at www.disposemymeds.org.          This list is accurate as of 11/6/18 11:59 PM.  Always use your most recent med list.                   Brand Name Dispense Instructions for use Diagnosis    acetaminophen 325 MG tablet    TYLENOL    100 tablet    Take 2 tablets (650 mg) by mouth every 6 hours as needed for mild pain or fever    Bilateral leg weakness       albuterol 108 (90 Base) MCG/ACT inhaler    PROAIR HFA/PROVENTIL HFA/VENTOLIN HFA    1 Inhaler    Inhale 1-2 puffs into the lungs every 4 hours as needed for shortness of breath / dyspnea or wheezing    Mild persistent asthma without complication       fluticasone 44 MCG/ACT Inhaler     FLOVENT HFA    3 Inhaler    Inhale 2 puffs into the lungs 2 times daily    Mild persistent asthma without complication       * ibuprofen 200 MG tablet    ADVIL/MOTRIN    100 tablet    Take 2 tablets (400 mg) by mouth every 4 hours as needed for mild pain    Breast pain       * ibuprofen 200 MG tablet    ADVIL/MOTRIN    100 tablet    Take 1 tablet (200 mg) by mouth every 4 hours as needed for mild pain    Pain       loratadine 10 MG tablet    CLARITIN    90 tablet    Take 1 tablet (10 mg) by mouth daily    Seasonal allergic rhinitis, unspecified allergic rhinitis trigger       omeprazole 20 MG tablet     90 tablet    Take 1 tablet (20 mg) by mouth daily Take 30-60 minutes before a meal.    Abdominal pain, epigastric, Nausea       * Notice:  This list has 2 medication(s) that are the same as other medications prescribed for you. Read the directions carefully, and ask your doctor or other care provider to review them with you.       moderate

## 2025-03-04 ENCOUNTER — MYC REFILL (OUTPATIENT)
Dept: FAMILY MEDICINE | Facility: CLINIC | Age: 25
End: 2025-03-04
Payer: COMMERCIAL

## 2025-03-04 DIAGNOSIS — R23.8 DRY SCALP: ICD-10-CM

## 2025-03-05 NOTE — TELEPHONE ENCOUNTER
"Request for medication refill:    Medication Name:   pyrithione zinc (SELSUN BLUE/HEAD AND SHOULDERS) 1 % external shampoo     Providers if patient needs an appointment and you are willing to give a one month supply please refill for one month and  send a MyChart using \".SMILLIMITEDREFILL\" .Or route chart to \"P SMI \" . To call patient and inform of limited refill and providers request to make an appointment. (Giving one month refill in non controlled medications is strongly recommended before denial)    If refill has been denied, meaning absolutely no refills without visit, please complete the smart phrase \".SMIRXREFUSE\" and route it to the \"P SMI MED REFILLS\"  pool to inform the patient and the pharmacy.    Britney Steele MA     "

## 2025-05-12 ENCOUNTER — TELEPHONE (OUTPATIENT)
Dept: FAMILY MEDICINE | Facility: CLINIC | Age: 25
End: 2025-05-12
Payer: COMMERCIAL

## 2025-05-12 NOTE — TELEPHONE ENCOUNTER
Patient Quality Outreach    Patient is due for the following:   Asthma  -  ACT needed and Asthma follow-up visit  Cervical Cancer Screening - PAP Needed    Action(s) Taken:   Schedule a office visit for followup.    Type of outreach:    Sent [employer] message.    Questions for provider review:    None         Britney Steele MA  Chart routed to None.

## 2025-06-19 DIAGNOSIS — L65.9 ALOPECIA: Primary | ICD-10-CM

## 2025-06-19 RX ORDER — KETOCONAZOLE 20 MG/ML
SHAMPOO, SUSPENSION TOPICAL DAILY PRN
Qty: 120 ML | Refills: 1 | Status: SHIPPED | OUTPATIENT
Start: 2025-06-19

## 2025-07-12 ENCOUNTER — HEALTH MAINTENANCE LETTER (OUTPATIENT)
Age: 25
End: 2025-07-12

## (undated) DEVICE — KIT ENDO FIRST STEP DISINFECTANT 200ML W/POUCH EP-4

## (undated) DEVICE — PREP CHLORAPREP 26ML TINTED ORANGE  260815

## (undated) DEVICE — PAD CHUX UNDERPAD 23X24" 7136

## (undated) RX ORDER — FENTANYL CITRATE 50 UG/ML
INJECTION, SOLUTION INTRAMUSCULAR; INTRAVENOUS
Status: DISPENSED
Start: 2024-10-11